# Patient Record
Sex: FEMALE | Race: WHITE | NOT HISPANIC OR LATINO | Employment: UNEMPLOYED | URBAN - NONMETROPOLITAN AREA
[De-identification: names, ages, dates, MRNs, and addresses within clinical notes are randomized per-mention and may not be internally consistent; named-entity substitution may affect disease eponyms.]

---

## 2023-03-29 ENCOUNTER — TRANSCRIBE ORDERS (OUTPATIENT)
Dept: PHYSICAL THERAPY | Facility: HOSPITAL | Age: 48
End: 2023-03-29
Payer: COMMERCIAL

## 2023-03-29 ENCOUNTER — HOSPITAL ENCOUNTER (OUTPATIENT)
Dept: PHYSICAL THERAPY | Facility: HOSPITAL | Age: 48
Setting detail: THERAPIES SERIES
Discharge: HOME | End: 2023-03-29
Payer: COMMERCIAL

## 2023-03-29 DIAGNOSIS — S83.239A COMPLEX TEAR OF MEDIAL MENISCUS OF KNEE AS CURRENT INJURY, INITIAL ENCOUNTER: Primary | ICD-10-CM

## 2023-03-29 DIAGNOSIS — S83.232A COMPLEX TEAR OF MEDIAL MENISCUS OF LEFT KNEE AS CURRENT INJURY, INITIAL ENCOUNTER: Primary | ICD-10-CM

## 2023-03-29 PROCEDURE — 97162 PT EVAL MOD COMPLEX 30 MIN: CPT

## 2023-03-29 NOTE — THERAPY EVALUATION
Outpatient Physical Therapy Ortho Initial Evaluation  [facility]     Patient Name: Roberta Hurley  : 1975  MRN: [id number]  Today's Date: 3/29/2023    Patient seen for 1 PT sessions.  Patient reports N/A% of improvement.  Next MD appt: tbd/prn  Recertification: 2023      Therapy Diagnosis: Complex L medial meniscus repair (DOS: 3/22/2023)     Visit Date: 2023    There is no problem list on file for this patient.       Past Medical History:   Diagnosis Date   • Asthma    • Cancer (HCC)    • Leukemia (HCC)         Past Surgical History:   Procedure Laterality Date   • HYSTERECTOMY     • MENISCECTOMY Left        Visit Dx:     ICD-10-CM ICD-9-CM   1. Complex tear of medial meniscus of left knee as current injury, subsequent encounter  S83.232D V58.89          Patient History     Row Name 23 1100             History    Chief Complaint Pain;Difficulty Walking;Balance Problems;Joint swelling  -AC      Type of Pain Knee pain  -AC      Date Current Problem(s) Began 23  -AC      Brief Description of Current Complaint Patient reports she hurt her L knee at work. Reports she got trapped from a big large pallette and fell and twist her knee. Then later on fell down some stairs. WBAT. Patient reports to use crutches until she is comfortable to walk without. Provided exercises after surgery quad sets, SLR after surgery. Patient reports 3 stairs with B HR. Flight of stairs inside home. doesnt have to go up them. Patient sleeps in her bed. Patient reports she props her knee at night time. Reports she lays mostly on her R side. 1 pillow in between knee and other pillow in between ankle.  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 PCO, ortho  -AC      Related/Recent Hospitalizations No  -AC      Location (Hospital Name) [facility]  -         Pain     Pain Location Knee  -AC      Pain at Present 8  -AC      Pain Frequency Constant/continuous  -AC         Fall Risk Assessment    Any falls in the past year: Yes  -AC      Number of falls reported in the last 12 months 3  -AC      Factors that contributed to the fall: Lost balance;Tripped  -AC            User Key  (r) = Recorded By, (t) = Taken By, (c) = Cosigned By    Initials Name Provider Type    AC Gianna Francisco PT Physical Therapist                 PT Ortho     Row Name 03/29/23 1100       Subjective Comments    Subjective Comments see patient history  -AC       Precautions and Contraindications    Precautions/Limitations other (see comments)  Medial meniscus repair precautions  -AC    Precautions WBAT; Limit flexion to 90 degrees for 6 weeks  -AC    Contraindications HX of CA; No ESTIM/US; Currently has Leukemia  -AC       Subjective Pain    Able to rate subjective pain? yes  -AC    Pre-Treatment Pain Level 8  -AC    Post-Treatment Pain Level 10  -AC       Posture/Observations    Forward Head Bilateral:;Mild  -AC    Scapular winging Bilateral:;Normal  -AC    Scoliosis Bilateral:;Normal  -AC    Lumbar lordosis Bilateral:;Normal  -AC    Iliac crests Bilateral:;Normal  pelvis level, no LLD/  -AC    Genu valgus Bilateral:;Normal  -AC    Genu varus Bilateral:;Normal  -AC    Posture/Observations Comments Patient does not appear to be in acute distress; Incisions healing well over L knee. 3 incisions present. No sign of infection. Increased swelling noted over L knee. Warm to touch. Slight bruising over L LE noted.  -AC       DTR- Lower Quarter Clearing    Patellar tendon (L2-4) Right:;2- Normal response  -AC    Achilles tendon (S1-2) Right:;2- Normal response  -AC       Sensory Screen for Light Touch- Lower Quarter Clearing    L1 (inguinal area) Bilateral:;Intact  -AC     L2 (anterior mid thigh) Bilateral:;Intact  -AC    L3 (distal anterior thigh) Bilateral:;Intact  -AC    L4 (medial lower leg/foot) Bilateral:;Intact  -AC    L5 (lateral lower leg/great toe) Bilateral:;Intact  -AC    S1 (bottom of foot) Bilateral:;Intact  -AC       Right Lower Ext    Rt Knee Extension/Flexion AROM 0-130°  -AC       Left Lower Ext    Lt Knee Extension/Flexion AROM 20°-57°  -AC       MMT (Manual Muscle Testing)    General MMT Comments R LE strength 5/5. L ankle DF strength 5/5. Patient unable to perform L SLR without min A from PT. Poor L quadricep activation noted.  -AC       Sensation    Sensation WNL? WNL  -AC    Light Touch No apparent deficits  -AC       Lower Extremity Flexibility    Hamstrings Left:;Mildly limited  -AC       RLE Quick Girth (cm)    Mid patella 40 cm  -AC    Distal thigh 47 cm  -AC    RLE Quick Girth Total 87  -AC       LLE Quick Girth (cm)    Mid patella 42 cm  -AC    Distal thigh 55 cm  -AC       Pathomechanics    Lower Extremity Pathomechanics Antalgic with midstance  -AC       Balance Skills Training    Balance Comments Unable to perform SLS on L LE due to increased pain and fall risk.  -AC       Transfers    Bed-Chair Reva (Transfers) set up;modified independence  -AC    Chair-Bed Reva (Transfers) set up;modified independence  -AC    Transfers, Bed-Chair-Bed, Assist Device axillary crutches  -AC    Sit-Stand Reva (Transfers) modified independence  -AC    Stand-Sit Reva (Transfers) modified independence  -AC    Transfers, Sit-Stand-Sit, Assist Device axillary crutches  -AC    Maintains Weight-bearing Status (Transfers) able to maintain  -AC    Transfer, Safety Issues balance decreased during turns;step length decreased  -AC    Transfer, Impairments pain;impaired balance;ROM decreased;decreased flexibility;strength decreased  -AC    Comment, (Transfers) Patient unweights L LE during sit to/from stand.  -AC       Gait/Stairs (Locomotion)     Humphreys Level (Gait) modified independence  -AC    Assistive Device (Gait) crutches, axillary  -AC    Distance in Feet (Gait) 75 feet  -AC    Pattern (Gait) 3-point;step-to  -AC    Deviations/Abnormal Patterns (Gait) left sided deviations;base of support, wide;gait speed decreased;antalgic  -AC    Left Sided Gait Deviations decreased knee extension;heel strike decreased  -AC    Stairs Assessment/Intervention deferred stairs this visit  -AC    Comment, (Gait/Stairs) Patient ambulates into clinic with B crutches.  -AC          User Key  (r) = Recorded By, (t) = Taken By, (c) = Cosigned By    Initials Name Provider Type    AC Gianna Francisco, PT Physical Therapist                            Therapy Education  Education Details: HEP provided this visit; education on performing activities every 1 hour on the hour. Icing regularly at least 15-20 mins each interval; no sleeping with pillow propped under knee and precautions; proper gait mechanics with crutches  Given: HEP, Symptoms/condition management, Pain management  Program: New  How Provided: Verbal, Demonstration, Written  Provided to: Patient  Level of Understanding: Verbalized, Demonstrated      PT OP Goals     Row Name 03/29/23 1100          PT Short Term Goals    STG Date to Achieve 04/19/23  -     STG 1 Patient to be I with HEP with additions/changes prior to next recertification.  -AC     STG 2 Patient to be compliant with limiting L knee flexion to 90 degrees for 6 weeks post-op.  -AC     STG 3 L knee flexion AROM >/= 90 degrees at 6 weeks post-op.  -AC     STG 4 L knee extension AROM </= 3°.  -AC     STG 5 Patient to perform 10 Sit to/from stand with equal weight bearing without cues from PT.  -     STG 6 Patient to perform L SLR x 20 reps with no knee lag present without cues from PT.  -        Long Term Goals    LTG 1 I with final HEP.  -AC     LTG 2 Patient to ambulate with no AD >/= 1/4 mile with non-antalgic, proper heel strike with no  difficulty.  -AC     LTG 3 L knee flexion AROM >/= 120°.  -AC     LTG 4 Patient to perform 30 second hold wall sit without pain or difficulty.  -AC     LTG 5 Patient to ascend/descend 3 steps with no HR assist or AD without no difficulty.  -AC     LTG 6 L LE strength 5/5.  -        Time Calculation    PT Goal Re-Cert Due Date 04/19/23  -           User Key  (r) = Recorded By, (t) = Taken By, (c) = Cosigned By    Initials Name Provider Type     Gianna Francisco, PT Physical Therapist                 PT Assessment/Plan     Row Name 03/29/23 1200          PT Assessment    Functional Limitations Impaired gait;Impaired locomotion;Limitation in home management;Limitations in community activities;Limitations in functional capacity and performance;Performance in leisure activities;Performance in self-care ADL;Performance in work activities  -     Impairments Balance;Gait;Coordination;Poor body mechanics;Pain;Range of motion;Muscle strength;Impaired muscle endurance;Impaired flexibility  -     Assessment Comments Patient is a 47 y.o. female who is s/p L medial mensicus repair on 3/22/2023. Patient presents with decreased L knee AROM, strength, flexibility, and increased pain. Patient also demo impaired gait mechanics and requires axillary crutches to ambulate at this time. Patient would benefit from skilled PT to address deficits listed and return to PLOF. Barrier to rehab at this time may be due to patient currently having Leukemia. Patient insurance does not allow tx to begin on initial eval. A small HEP was established.  -AC     Please refer to paper survey for additional self-reported information No  -AC     Rehab Potential Good  -AC     Patient/caregiver participated in establishment of treatment plan and goals Yes  -AC     Patient would benefit from skilled therapy intervention Yes  -AC        PT Plan    PT Frequency 3x/week  -AC     Predicted Duration of Therapy Intervention (PT) 15-20 visits  -AC      Planned CPT's? PT EVAL MOD COMPLELITY: 33213;PT RE-EVAL: 34852;PT THER PROC EA 15 MIN: 52424;PT THER ACT EA 15 MIN: 33719;PT MANUAL THERAPY EA 15 MIN: 94028;PT NEUROMUSC RE-EDUCATION EA 15 MIN: 21186;PT GAIT TRAINING EA 15 MIN: 62473;PT SELF CARE/HOME MGMT/TRAIN EA 15: 83228;PT HOT OR COLD PACK TREAT [facility];PT SELF CARE/MGMT/TRAIN 15 MIN: 60610;PT THER SUPP EA 15 MIN  -AC     PT Plan Comments Progress overall L knee AROM, strength, flexibility, and gait.  -AC           User Key  (r) = Recorded By, (t) = Taken By, (c) = Cosigned By    Initials Name Provider Type    Gianna Azul PT Physical Therapist                   OP Exercises     Row Name 03/29/23 1100             Subjective Comments    Subjective Comments see patient history  -AC         Subjective Pain    Able to rate subjective pain? yes  -AC      Pre-Treatment Pain Level 8  -AC      Post-Treatment Pain Level 10  -AC         Exercise 1    Exercise Name 1 Quad Sets  -AC      Cueing 1 Verbal;Tactile;Demo  -AC      Sets 1 1  -AC      Reps 1 20  -AC      Time 1 5 sec hold  -AC      Additional Comments towel roll under knee  -AC         Exercise 2    Exercise Name 2 Heel slides  -AC      Cueing 2 Verbal;Tactile  -AC      Time 2 3 minutes with 5 sec hold at top position  -AC         Exercise 3    Exercise Name 3 Heel prop for knee extensions  -AC      Cueing 3 Verbal;Tactile  -AC      Time 3 3 minutes  -AC         Exercise 4    Exercise Name 4 Patient education  -AC      Additional Comments see flowsheet  -AC         Exercise 5    Exercise Name 5 Ice provided to go  -AC            User Key  (r) = Recorded By, (t) = Taken By, (c) = Cosigned By    Initials Name Provider Type    Gianna Azul PT Physical Therapist                              Outcome Measure Options: Lower Extremity Functional Scale (LEFS)  Lower Extremity Functional Index  Any of your usual work, housework or school activities: Extreme difficulty or unable to perform activity  Your usual  hobbies, recreational or sporting activities: Extreme difficulty or unable to perform activity  Getting into or out of the bath: Quite a bit of difficulty  Walking between rooms: Extreme difficulty or unable to perform activity  Putting on your shoes or socks: Quite a bit of difficulty  Squatting: Extreme difficulty or unable to perform activity  Lifting an object, like a bag of groceries from the floor: Extreme difficulty or unable to perform activity  Performing light activities around your home: Extreme difficulty or unable to perform activity  Performing heavy activities around your home: Extreme difficulty or unable to perform activity  Getting into or out of a car: Extreme difficulty or unable to perform activity  Walking 2 blocks: Extreme difficulty or unable to perform activity  Walking a mile: Extreme difficulty or unable to perform activity  Going up or down 10 stairs (about 1 flight of stairs): Extreme difficulty or unable to perform activity  Standing for 1 hour: Extreme difficulty or unable to perform activity  Sitting for 1 hour: Extreme difficulty or unable to perform activity  Running on even ground: Extreme difficulty or unable to perform activity  Running on uneven ground: Extreme difficulty or unable to perform activity  Making sharp turns while running fast: Extreme difficulty or unable to perform activity  Hopping: Extreme difficulty or unable to perform activity  Rolling over in bed: Extreme difficulty or unable to perform activity  Total: 2      Time Calculation:     Start Time: 1140  Stop Time: 1224  Time Calculation (min): 44 min     Therapy Charges for Today     Code Description Service Date Service Provider Modifiers Qty    93472051966 HC PT THER SUPP EA 15 MIN 3/29/2023 [place], PT GP 1    40814681937 HC PT EVAL MOD COMPLEXITY 3 3/29/2023 [place] Gianna, PT GP 1          PT G-Codes  Outcome Measure Options: Lower Extremity Functional Scale (LEFS)  Total: 2         Autumn  Maryam, PT , DPT 3/29/2023

## 2023-03-30 ENCOUNTER — HOSPITAL ENCOUNTER (OUTPATIENT)
Dept: PHYSICAL THERAPY | Facility: HOSPITAL | Age: 48
Setting detail: THERAPIES SERIES
Discharge: HOME | End: 2023-03-30
Payer: COMMERCIAL

## 2023-03-30 DIAGNOSIS — S83.239A COMPLEX TEAR OF MEDIAL MENISCUS OF KNEE AS CURRENT INJURY, INITIAL ENCOUNTER: Primary | ICD-10-CM

## 2023-03-30 PROCEDURE — 97110 THERAPEUTIC EXERCISES: CPT | Performed by: PHYSICAL THERAPIST

## 2023-03-30 NOTE — THERAPY TREATMENT NOTE
Outpatient Physical Therapy Ortho Treatment Note  [facility]     Patient Name: Roberta Hurley  : 1975  MRN: [id number]  Today's Date: 3/30/2023      Visit Date: 2023     Patient seen for 2 PT sessions.  Patient reports N/A% of improvement.  Next MD appt: solange/prn  Recertification: 2023      Therapy Diagnosis: Complex L medial meniscus repair (DOS: 3/22/2023)     Visit Dx:    ICD-10-CM ICD-9-CM   1. Complex tear of medial meniscus of knee as current injury, initial encounter  S83.239A 836.0       There is no problem list on file for this patient.       Past Medical History:   Diagnosis Date   • Asthma    • Cancer (HCC)    • Leukemia (HCC)         Past Surgical History:   Procedure Laterality Date   • HYSTERECTOMY     • MENISCECTOMY Left         PT Ortho     Row Name 23 1100       Precautions and Contraindications    Precautions WBAT; Limit flexion to 90 degrees for 6 weeks  -AJ    Contraindications HX of CA; No ESTIM/US; Currently has Leukemia  -AJ       Subjective Pain    Pre-Treatment Pain Level 6  -AJ    Post-Treatment Pain Level 7  -AJ       Left Lower Ext    Lt Knee Extension/Flexion AROM 10°-90°  -          User Key  (r) = Recorded By, (t) = Taken By, (c) = Cosigned By    Initials Name Provider Type    Danyell Dixon, PT DPT Physical Therapist                             PT Assessment/Plan     Row Name 23 1100          PT Assessment    Assessment Comments Improved AROM today in both flexin and extension. Changed heel slides ot sitting to allow patient ot visualize 90° better. Added add sets to HEP. Unable to make a full revolution with Pro II LE bike.  -        PT Plan    PT Frequency 3x/week  -     PT Plan Comments Try gait with 1 axillary crutch next session.  -           User Key  (r) = Recorded By, (t) = Taken By, (c) = Cosigned By    Initials Name Provider Type    Danyell Dixon, [place] DPT Physical Therapist                   OP  "Exercises     Row Name 03/30/23 1100             Subjective Comments    Subjective Comments patient eports when she does heel slides it feels like it is riping inside.  -AJ         Subjective Pain    Able to rate subjective pain? yes  -AJ      Pre-Treatment Pain Level 6  -AJ      Post-Treatment Pain Level 7  -AJ         Exercise 1    Exercise Name 1 Crutches adjusted ot better fit patient  -AJ         Exercise 2    Exercise Name 2 Pro II LE- Rocking for ROM  -AJ      Time 2 5 min  -AJ      Additional Comments s=7, L 0.0  -AJ         Exercise 3    Exercise Name 3 Heel slides to 90° in sitting  -AJ      Sets 3 1  -AJ      Reps 3 10  -AJ      Time 3 5\" hold  -AJ         Exercise 4    Exercise Name 4 Quad sets with towel roll  -AJ      Sets 4 1  -AJ      Reps 4 20  -AJ      Time 4 5\" hold  -AJ         Exercise 5    Exercise Name 5 Add squeezes  -AJ      Sets 5 1  -AJ      Reps 5 20  -AJ      Time 5 5\" hold  -AJ         Exercise 6    Exercise Name 6 L SLR  -AJ      Sets 6 2  -AJ      Reps 6 5  -AJ      Time 6 no hold  -AJ      Additional Comments max A  -AJ         Exercise 7    Exercise Name 7 Heel prop  -AJ      Time 7 5 min  -AJ            User Key  (r) = Recorded By, (t) = Taken By, (c) = Cosigned By    Initials Name Provider Type    Danyell Dixon, PT DPT Physical Therapist                 All therapeutic interventions performed today were to address current functional limitations and/or deficits in addressing all physical therapy goals.                PT OP Goals     Row Name 03/30/23 1100          PT Short Term Goals    STG Date to Achieve 04/19/23  -AJ     STG 1 Patient to be I with HEP with additions/changes prior to next recertification.  -AJ     STG 1 Progress Partially Met;Ongoing  -AJ     STG 2 Patient to be compliant with limiting L knee flexion to 90 degrees for 6 weeks post-op.  -AJ     STG 2 Progress Ongoing;Progressing  -AJ     STG 3 L knee flexion AROM >/= 90 degrees at 6 weeks post-op.  -AJ  "    STG 3 Progress Met;Ongoing  -     STG 4 L knee extension AROM </= 3°.  -     STG 4 Progress Ongoing;Progressing  -     STG 5 Patient to perform 10 Sit to/from stand with equal weight bearing without cues from PT.  -     STG 6 Patient to perform L SLR x 20 reps with no knee lag present without cues from PT.  -        Long Term Goals    LTG 1 I with final HEP.  -     LTG 2 Patient to ambulate with no AD >/= 1/4 mile with non-antalgic, proper heel strike with no difficulty.  -     LTG 3 L knee flexion AROM >/= 120°.  -     LTG 4 Patient to perform 30 second hold wall sit without pain or difficulty.  -     LTG 5 Patient to ascend/descend 3 steps with no HR assist or AD without no difficulty.  -     LTG 6 L LE strength 5/5.  -        Time Calculation    PT Goal Re-Cert Due Date 04/19/23  -           User Key  (r) = Recorded By, (t) = Taken By, (c) = Cosigned By    Initials Name Provider Type    Danyell Dixon PT DPT Physical Therapist                Therapy Education  Education Details: HEP: switch heel slides with strap to seated heel slides t obetter asses 90° stopping point.  Given: HEP, Symptoms/condition management, Pain management, Mobility training  Program: New, Reinforced, Modified  How Provided: Verbal  Provided to: Patient  Level of Understanding: Teach back education performed, Verbalized, Demonstrated              Time Calculation:   Start Time: 1137 (Patient arrived late today.)  Stop Time: 1216  Time Calculation (min): 39 min  Therapy Charges for Today     Code Description Service Date Service Provider Modifiers Qty    30517684013 HC PT THER SUPP EA 15 MIN 3/30/2023 Danyell Merida PT DPT GP 1    70583794382 HC PT THER PROC EA 15 MIN 3/30/2023 Danyell Merida PT DPT GP 3                This document has been electronically signed by Danyell Merida PT DPT, [facility] on March 30, 2023 13:34 CDT

## 2023-04-03 ENCOUNTER — HOSPITAL ENCOUNTER (OUTPATIENT)
Dept: PHYSICAL THERAPY | Facility: HOSPITAL | Age: 48
Setting detail: THERAPIES SERIES
Discharge: HOME | End: 2023-04-03
Payer: COMMERCIAL

## 2023-04-03 DIAGNOSIS — S83.239A COMPLEX TEAR OF MEDIAL MENISCUS OF KNEE AS CURRENT INJURY, INITIAL ENCOUNTER: Primary | ICD-10-CM

## 2023-04-03 PROCEDURE — 97110 THERAPEUTIC EXERCISES: CPT

## 2023-04-03 NOTE — THERAPY TREATMENT NOTE
Outpatient Physical Therapy Ortho Treatment Note  [facility]     Patient Name: Roberta Hurley  : 1975  MRN: [id number]  Today's Date: 4/3/2023      Visit Date: 2023     Patient seen for 3 PT sessions.  Patient reports N/A% of improvement.  Next MD appt: solange/prn  Recertification: 2023      Therapy Diagnosis: Complex L medial meniscus repair (DOS: 3/22/2023)     Visit Dx:    ICD-10-CM ICD-9-CM   1. Complex tear of medial meniscus of knee as current injury, initial encounter  S83.239A 836.0       There is no problem list on file for this patient.       Past Medical History:   Diagnosis Date   • Asthma    • Cancer    • Leukemia         Past Surgical History:   Procedure Laterality Date   • HYSTERECTOMY     • MENISCECTOMY Left         PT Ortho     Row Name 23 1300       Precautions and Contraindications    Precautions WBAT; Limit flexion to 90 degrees for 6 weeks  -    Contraindications HX of CA; No ESTIM/US; Currently has Leukemia  -       Subjective Pain    Able to rate subjective pain? yes  -    Pre-Treatment Pain Level 7  -          User Key  (r) = Recorded By, (t) = Taken By, (c) = Cosigned By    Initials Name Provider Type    Thelma Wesley, ASPEN Physical Therapist Assistant                             PT Assessment/Plan     Row Name 23 1300          PT Assessment    Assessment Comments continues to lack full knee extension on visual assessment. difficulty with SLR today, but is finally able to pick it up, though with quad lag. did not progress to single crutch secondary to trip today with pt reporting increased pain.  -        PT Plan    PT Frequency 3x/week  -     PT Plan Comments standing weight shifts, single crutch gait, step up 4 inch step, shuttle low resistance  -           User Key  (r) = Recorded By, (t) = Taken By, (c) = Cosigned By    Initials Name Provider Type    Thelma Wesley PTA Physical Therapist Assistant                   OP  "Exercises     Row Name 04/03/23 1300             Subjective Comments    Subjective Comments states that she had a trip this morning. was wearing open toed shoes. states that she caught herself with the left leg, jarried the knee. didn't bend it  -         Subjective Pain    Able to rate subjective pain? yes  -      Pre-Treatment Pain Level 7  -      Post-Treatment Pain Level --  \"numb\"  -         Exercise 1    Exercise Name 1 Pro II LE's full revolutions  -      Time 1 10 minutes  -      Additional Comments L 1.0 Seat 9 less than 90° knee flexion  -         Exercise 2    Exercise Name 2 Heel Prop  -      Time 2 5 minutes  -         Exercise 3    Exercise Name 3 Quad Sets with Towel Roll  -      Reps 3 20  -      Time 3 5 sec hold  -         Exercise 4    Exercise Name 4 SLR with Clinician Assist  -      Reps 4 15  -MH      Additional Comments max assist  -         Exercise 5    Exercise Name 5 SAQ  -      Sets 5 2  -MH      Reps 5 15  -      Time 5 5 sec hold  -         Exercise 6    Exercise Name 6 SLR no assist  -      Sets 6 2  -      Reps 6 10  -      Additional Comments no hold  -         Exercise 7    Exercise Name 7 Prone TKE's  -      Sets 7 2  -MH      Reps 7 15  -      Time 7 5 sec hold  -         Exercise 8    Exercise Name 8 Heel Slides with quad sets in extension  -      Additional Comments 90° flexion restriction  -            User Key  (r) = Recorded By, (t) = Taken By, (c) = Cosigned By    Initials Name Provider Type    Thelma Wesley, PTA Physical Therapist Assistant                              PT OP Goals     Row Name 04/03/23 1300          PT Short Term Goals    STG Date to Achieve 04/19/23  -     STG 1 Patient to be I with HEP with additions/changes prior to next recertification.  -     STG 1 Progress Partially Met;Ongoing  -     STG 2 Patient to be compliant with limiting L knee flexion to 90 degrees for 6 weeks post-op.  -     STG " 2 Progress Ongoing;Progressing  -     STG 3 L knee flexion AROM >/= 90 degrees at 6 weeks post-op.  -     STG 3 Progress Met;Ongoing  -     STG 4 L knee extension AROM </= 3°.  -     STG 4 Progress Ongoing;Progressing  -     STG 5 Patient to perform 10 Sit to/from stand with equal weight bearing without cues from PT.  -     STG 6 Patient to perform L SLR x 20 reps with no knee lag present without cues from PT.  -        Long Term Goals    LTG 1 I with final HEP.  -     LTG 2 Patient to ambulate with no AD >/= 1/4 mile with non-antalgic, proper heel strike with no difficulty.  -     LTG 3 L knee flexion AROM >/= 120°.  -     LTG 4 Patient to perform 30 second hold wall sit without pain or difficulty.  -     LTG 5 Patient to ascend/descend 3 steps with no HR assist or AD without no difficulty.  -     LTG 6 L LE strength 5/5.  -        Time Calculation    PT Goal Re-Cert Due Date 04/19/23  -           User Key  (r) = Recorded By, (t) = Taken By, (c) = Cosigned By    Initials Name Provider Type    Thelma Wesley PTA Physical Therapist Assistant                Therapy Education  Education Details: SAQ, SLR  Given: HEP, Symptoms/condition management, Pain management, Mobility training  Program: Reinforced  How Provided: Verbal, Demonstration, Written  Provided to: Patient  Level of Understanding: Teach back education performed, Verbalized, Demonstrated              Time Calculation:   Start Time: 1345  Stop Time: 1455  Time Calculation (min): 70 min  Total Timed Code Minutes- PT: 70 minute(s)  Therapy Charges for Today     Code Description Service Date Service Provider Modifiers Qty    65273890708 HC PT THER SUPP EA 15 MIN 4/3/2023 Thelma Mccrary PTA GP, CQ 1    80571924179 HC PT THER PROC EA 15 MIN 4/3/2023 Thelma Mccrary PTA GP, CQ 5                    Thelma Mccrary PTA  4/3/2023       This document has been electronically signed by Thelma Mccrary PTA on April 3, 2023 15:07 CDT

## 2023-04-05 ENCOUNTER — HOSPITAL ENCOUNTER (OUTPATIENT)
Dept: PHYSICAL THERAPY | Facility: HOSPITAL | Age: 48
Setting detail: THERAPIES SERIES
Discharge: HOME | End: 2023-04-05
Payer: COMMERCIAL

## 2023-04-05 DIAGNOSIS — S83.239A COMPLEX TEAR OF MEDIAL MENISCUS OF KNEE AS CURRENT INJURY, INITIAL ENCOUNTER: Primary | ICD-10-CM

## 2023-04-05 PROCEDURE — 97110 THERAPEUTIC EXERCISES: CPT

## 2023-04-05 PROCEDURE — 97140 MANUAL THERAPY 1/> REGIONS: CPT

## 2023-04-05 NOTE — THERAPY TREATMENT NOTE
Outpatient Physical Therapy Ortho Treatment Note  [facility]     Patient Name: Roberta Hurley  : 1975  MRN: [id number]  Today's Date: 2023     Pt seen for 4 PT sessions  Reported Improvement:  N/A %  MD Visit: 2023  Recheck Date: 2023    Therapy Diagnosis:  Complex L medial meniscus repair (DOS: 3/22/2023)         Visit Date: 2023    Visit Dx:    ICD-10-CM ICD-9-CM   1. Complex tear of medial meniscus of knee as current injury, initial encounter  S83.239A 836.0       There is no problem list on file for this patient.       Past Medical History:   Diagnosis Date   • Asthma    • Cancer    • Leukemia         Past Surgical History:   Procedure Laterality Date   • HYSTERECTOMY     • MENISCECTOMY Left         PT Ortho     Row Name 23 1300       Subjective Comments    Subjective Comments Doing better today, been using one crutch most of the day.  -       Precautions and Contraindications    Precautions/Limitations --  Medial Meniscus precautions  -    Precautions WBAT; Limit flexion to 90 degrees for 6 weeks  -    Contraindications HX of CA; No ESTIM/US; Currently has Leukemia  -       Subjective Pain    Able to rate subjective pain? yes  -    Pre-Treatment Pain Level 5  -JW       Left Lower Ext    Lt Knee Extension/Flexion AROM 8°  -          User Key  (r) = Recorded By, (t) = Taken By, (c) = Cosigned By    Initials Name Provider Type    Nya Riggins PTA Physical Therapist Assistant                             PT Assessment/Plan     Row Name 23 1300          PT Assessment    Assessment Comments Pt lacks full knee extension with gait despite vc's.  Pt arrives using single crutch appropriately with antalgic pattern.  Good effort with all therex and receptive to education and reinformcemtn of improved mm recruitment and stronger mm contraction. especially with QS..  Pt reports no pian with QS.  Defers ice post tx session.  Instruted to begin scar  "massasge and reinforced QS and SLR  -JW        PT Plan    PT Frequency 3x/week  -JW     PT Plan Comments Next visit add shuttle with low resistance.  -JW           User Key  (r) = Recorded By, (t) = Taken By, (c) = Cosigned By    Initials Name Provider Type    Nya Riggins PTA Physical Therapist Assistant                   OP Exercises     Row Name 04/05/23 1300             Subjective Comments    Subjective Comments Doing better today, been using one crutch most of the day.  -JW         Subjective Pain    Able to rate subjective pain? yes  -JW      Pre-Treatment Pain Level 5  -JW      Post-Treatment Pain Level --  \"feels kind of numb\"  -JW         Exercise 1    Exercise Name 1 Pro II LE's full revolutions  -JW      Time 1 10 minutes  -JW      Additional Comments S=9 for 90° precautions  -JW         Exercise 2    Exercise Name 2 B st HS st  -JW      Reps 2 2  -JW      Time 2 30  -JW         Exercise 3    Exercise Name 3 B incline gastroc st  -JW      Reps 3 2  -JW      Time 3 30  -JW         Exercise 4    Exercise Name 4 Scar massage and patellar mobs  -JW         Exercise 5    Exercise Name 5 Heel Prop  -JW      Time 5 5 min  -JW         Exercise 6    Exercise Name 6 Heel slides  -JW      Reps 6 20  -JW      Additional Comments no strap - 90° restriction  -JW         Exercise 7    Exercise Name 7 QS  -JW      Sets 7 2  -JW      Reps 7 10  -JW      Time 7 5 sec hold  -JW      Additional Comments emphasis on proper form  -JW         Exercise 8    Exercise Name 8 SLR  -JW      Sets 8 2  -JW      Reps 8 10  -JW      Additional Comments lag present  -JW            User Key  (r) = Recorded By, (t) = Taken By, (c) = Cosigned By    Initials Name Provider Type    Nya Riggins PTA Physical Therapist Assistant                         Manual Rx (last 36 hours)     Manual Treatments     Row Name 04/05/23 1300             Manual Rx 1    Manual Rx 1 Location L knee  -JW      Manual Rx 1 Type scar massage and patellar " mobs  -      Manual Rx 1 Duration 8 min  -            User Key  (r) = Recorded By, (t) = Taken By, (c) = Cosigned By    Initials Name Provider Type    JOSIANE Nya Soriano PTA Physical Therapist Assistant                 PT OP Goals     Row Name 04/05/23 1300          PT Short Term Goals    STG Date to Achieve 04/19/23  -     STG 1 Patient to be I with HEP with additions/changes prior to next recertification.  -     STG 1 Progress Partially Met;Ongoing  -     STG 2 Patient to be compliant with limiting L knee flexion to 90 degrees for 6 weeks post-op.  -     STG 2 Progress Ongoing;Progressing  -     STG 3 L knee flexion AROM >/= 90 degrees at 6 weeks post-op.  -     STG 3 Progress Met;Ongoing  -     STG 4 L knee extension AROM </= 3°.  -     STG 4 Progress Ongoing;Progressing  -     STG 5 Patient to perform 10 Sit to/from stand with equal weight bearing without cues from PT.  -     STG 6 Patient to perform L SLR x 20 reps with no knee lag present without cues from PT.  -        Long Term Goals    LTG 1 I with final HEP.  -     LTG 2 Patient to ambulate with no AD >/= 1/4 mile with non-antalgic, proper heel strike with no difficulty.  -     LTG 3 L knee flexion AROM >/= 120°.  -     LTG 4 Patient to perform 30 second hold wall sit without pain or difficulty.  -     LTG 5 Patient to ascend/descend 3 steps with no HR assist or AD without no difficulty.  -     LTG 6 L LE strength 5/5.  -        Time Calculation    PT Goal Re-Cert Due Date 04/19/23  -           User Key  (r) = Recorded By, (t) = Taken By, (c) = Cosigned By    Initials Name Provider Type    JOSIANE Nya Soriano PTA Physical Therapist Assistant                               Time Calculation:   Start Time: 1300  Stop Time: 1355  Time Calculation (min): 55 min  Therapy Charges for Today     Code Description Service Date Service Provider Modifiers Qty    39103419675  PT THER PROC EA 15 MIN 4/5/2023 Nya Soriano PTA GP,  CQ 3    10441727840 HC PT MANUAL THERAPY EA 15 MIN 4/5/2023 Nya Soriano PTA GP, CQ 1    40371798014 HC PT THER SUPP EA 15 MIN 4/5/2023 Nya Soriano PTA GP, CQ 1                    Nya Soriano PTA  4/5/2023

## 2023-04-07 ENCOUNTER — HOSPITAL ENCOUNTER (OUTPATIENT)
Dept: PHYSICAL THERAPY | Facility: HOSPITAL | Age: 48
Setting detail: THERAPIES SERIES
Discharge: HOME | End: 2023-04-07
Payer: COMMERCIAL

## 2023-04-07 DIAGNOSIS — S83.239A COMPLEX TEAR OF MEDIAL MENISCUS OF KNEE AS CURRENT INJURY, INITIAL ENCOUNTER: Primary | ICD-10-CM

## 2023-04-07 PROCEDURE — 97110 THERAPEUTIC EXERCISES: CPT

## 2023-04-07 NOTE — THERAPY TREATMENT NOTE
"    Outpatient Physical Therapy Ortho Treatment Note  [facility]     Patient Name: Roberta Hurley  : 1975  MRN: [id number]  Today's Date: 2023    Pt seen for 5 PT sessions  Reported Improvement:  N/A %  MD Visit: 2023  Recheck Date: 2023     Therapy Diagnosis:  Complex L medial meniscus repair (DOS: 3/22/2023)                               Visit Date: 2023    Visit Dx:    ICD-10-CM ICD-9-CM   1. Complex tear of medial meniscus of knee as current injury, initial encounter  S83.239A 836.0       There is no problem list on file for this patient.       Past Medical History:   Diagnosis Date   • Asthma    • Cancer    • Leukemia         Past Surgical History:   Procedure Laterality Date   • HYSTERECTOMY     • MENISCECTOMY Left         PT Ortho     Row Name 23 1100       Precautions and Contraindications    Precautions/Limitations --  Medial Meniscus precautions  -AC    Precautions WBAT; Limit flexion to 90 degrees for 6 weeks  -AC    Contraindications HX of CA; No ESTIM/US; Currently has Leukemia  -AC       Subjective Pain    Able to rate subjective pain? yes  -AC    Post-Treatment Pain Level 0  \"feels numb\"  -AC          User Key  (r) = Recorded By, (t) = Taken By, (c) = Cosigned By    Initials Name Provider Type    Gianna Azul, PT Physical Therapist                             PT Assessment/Plan     Row Name 23 1200          PT Assessment    Assessment Comments Patient still presents with L knee lag with L SLR. Demo improved ability to maintain hold when performing L SLR. Patient able to maintain 90° knee flexion restriction well. Patient also demo good recall of HEP. Ice to go post tx session.  -AC        PT Plan    PT Frequency 3x/week  -AC     PT Plan Comments Attempt prone hang next visit with ankle weight. Revisit patellar mobs and scar massage for reinforcement. Measure L knee ext AROM next visit. Update HEP.  -AC           User Key  (r) = Recorded " "By, (t) = Taken By, (c) = Cosigned By    Initials Name Provider Type    AC Gianna Francisco, PT Physical Therapist                   OP Exercises     Row Name 04/07/23 1100             Subjective Comments    Subjective Comments Knee if feeling better. Feel like i can walk better. Just slight discomfort in knee today.  -AC         Subjective Pain    Able to rate subjective pain? yes  -AC      Pre-Treatment Pain Level 5  -AC      Post-Treatment Pain Level 0  \"feels numb\"  -AC         Exercise 1    Exercise Name 1 Pro II LE's full revolutions  -AC      Time 1 10 minutes  -AC      Additional Comments S=9 for 90° precautions  -AC         Exercise 2    Exercise Name 2 B st HS st  -AC      Reps 2 2  -AC      Time 2 30  -AC         Exercise 3    Exercise Name 3 B incline gastroc st  -AC      Reps 3 2  -AC      Time 3 30  -AC         Exercise 4    Exercise Name 4 Step ups - F/L  -AC      Sets 4 2  -AC      Reps 4 10  -AC      Additional Comments 4\" step to maintain 90° restriction  -AC         Exercise 5    Exercise Name 5 Shuttle press: 2L  -AC      Time 5 3 mins  -AC      Additional Comments 2 bands  -AC         Exercise 6    Exercise Name 6 pbars  fwd/retro gait  -AC      Reps 6 6 laps  -AC      Additional Comments no HR assist  -AC         Exercise 7    Exercise Name 7 QS  -AC      Sets 7 1  -AC      Reps 7 20  -AC      Time 7 5 sec hold  -AC      Additional Comments emphasis on proper form  -AC         Exercise 8    Exercise Name 8 SLR  -AC      Sets 8 2  -AC      Reps 8 10  -AC      Additional Comments slight knee lag present  -AC         Exercise 9    Exercise Name 9 Prone TKE  -AC      Sets 9 2  -AC      Reps 9 15  -AC      Time 9 5\" hold  -AC         Exercise 10    Exercise Name 10 Heel prop  -AC      Time 10 5 minutes  -AC      Additional Comments 2# weight  -AC            User Key  (r) = Recorded By, (t) = Taken By, (c) = Cosigned By    Initials Name Provider Type    AC Gianna Francisco, PT Physical Therapist    "                           PT OP Goals     Row Name 04/07/23 1200          PT Short Term Goals    STG Date to Achieve 04/19/23  -AC     STG 1 Patient to be I with HEP with additions/changes prior to next recertification.  -AC     STG 1 Progress Partially Met;Ongoing  -AC     STG 2 Patient to be compliant with limiting L knee flexion to 90 degrees for 6 weeks post-op.  -AC     STG 2 Progress Ongoing;Progressing  -AC     STG 3 L knee flexion AROM >/= 90 degrees at 6 weeks post-op.  -AC     STG 3 Progress Met;Ongoing  -AC     STG 4 L knee extension AROM </= 3°.  -AC     STG 4 Progress Ongoing;Progressing  -AC     STG 5 Patient to perform 10 Sit to/from stand with equal weight bearing without cues from PT.  -     STG 6 Patient to perform L SLR x 20 reps with no knee lag present without cues from PT.  -        Long Term Goals    LTG 1 I with final HEP.  -     LTG 2 Patient to ambulate with no AD >/= 1/4 mile with non-antalgic, proper heel strike with no difficulty.  -AC     LTG 3 L knee flexion AROM >/= 120°.  -     LTG 4 Patient to perform 30 second hold wall sit without pain or difficulty.  -     LTG 5 Patient to ascend/descend 3 steps with no HR assist or AD without no difficulty.  -     LTG 6 L LE strength 5/5.  -        Time Calculation    PT Goal Re-Cert Due Date 04/19/23  -           User Key  (r) = Recorded By, (t) = Taken By, (c) = Cosigned By    Initials Name Provider Type    AC Gianna Francisco, PT Physical Therapist                               Time Calculation:   Start Time: 1132  Stop Time: 1220  Time Calculation (min): 48 min  Total Timed Code Minutes- PT: 48 minute(s)  Therapy Charges for Today     Code Description Service Date Service Provider Modifiers Qty    83465292095 HC PT THER SUPP EA 15 MIN 4/7/2023 Gianna Francisco, PT GP 1    39601654284 HC PT THER PROC EA 15 MIN 4/7/2023 Gianna Francisco, PT GP 3                    Gianna SD Francisco , DPT 4/7/2023

## 2023-04-10 ENCOUNTER — HOSPITAL ENCOUNTER (OUTPATIENT)
Dept: PHYSICAL THERAPY | Facility: HOSPITAL | Age: 48
Setting detail: THERAPIES SERIES
Discharge: HOME | End: 2023-04-10
Payer: COMMERCIAL

## 2023-04-10 DIAGNOSIS — S83.239A COMPLEX TEAR OF MEDIAL MENISCUS OF KNEE AS CURRENT INJURY, INITIAL ENCOUNTER: Primary | ICD-10-CM

## 2023-04-10 NOTE — THERAPY TREATMENT NOTE
Outpatient Physical Therapy Ortho Treatment Note  [facility]     Patient Name: Roberta Hurley  : 1975  MRN: [id number]  Today's Date: 4/10/2023     Pt seen for 6 PT sessions  Reported Improvement:  N/A %  MD Visit: 2023  Recheck Date: 2023    Therapy Diagnosis:   Complex L medial meniscus repair (DOS: 3/22/2023)         Visit Date: 04/10/2023    Visit Dx:    ICD-10-CM ICD-9-CM   1. Complex tear of medial meniscus of knee as current injury, initial encounter  S83.239A 836.0       There is no problem list on file for this patient.       Past Medical History:   Diagnosis Date   • Asthma    • Cancer    • Leukemia         Past Surgical History:   Procedure Laterality Date   • HYSTERECTOMY     • MENISCECTOMY Left         PT Ortho     Row Name 04/10/23 1300       Subjective Comments    Subjective Comments Doing good, had to work a little yesterday but did my exercises  -       Precautions and Contraindications    Precautions/Limitations --  Medial Meniscus precautions  -    Precautions WBAT; Limit flexion to 90 degrees for 6 weeks  -    Contraindications HX of CA; No ESTIM/US; Currently has Leukemia  -       Subjective Pain    Able to rate subjective pain? yes  -    Pre-Treatment Pain Level 4  with full extension  -       Left Lower Ext    Lt Knee Extension/Flexion AROM 5° - 90°  -          User Key  (r) = Recorded By, (t) = Taken By, (c) = Cosigned By    Initials Name Provider Type    Nya Riggins PTA Physical Therapist Assistant                             PT Assessment/Plan     Row Name 04/10/23 1400          PT Assessment    Assessment Comments Pt enters clinic without any AD.  Pt ambulates with L knee flexed at ~ 15°.  Pt educated on importance of full knee extension for normalized gait. Pt struggles with heel walks in pbars .  Much improved SLR with minimal lag present as muscle fatigues. Defers ice post tx session.  -        PT Plan    PT Frequency  "3x/week  -JW     PT Plan Comments Next visit add banded or CC TKE's.  continue prone hangs for ROM.  -JW           User Key  (r) = Recorded By, (t) = Taken By, (c) = Cosigned By    Initials Name Provider Type    Nya Riggins PTA Physical Therapist Assistant                   OP Exercises     Row Name 04/10/23 1300             Subjective Comments    Subjective Comments Doing good, had to work a little yesterday but did my exercises  -JW         Subjective Pain    Able to rate subjective pain? yes  -JW      Pre-Treatment Pain Level 4  with full extension  -JW      Post-Treatment Pain Level 0  -JW         Exercise 1    Exercise Name 1 Pro II LE's full revolutions  -JW      Time 1 10 minutes  -JW      Additional Comments S+9 for 90° L 5.0  -JW         Exercise 2    Exercise Name 2 B st HS st  -JW      Reps 2 2  -JW      Time 2 30  -JW         Exercise 3    Exercise Name 3 B incline gastroc st  -JW      Reps 3 2  -JW      Time 3 30  -JW         Exercise 4    Exercise Name 4 Prone Hang  -JW      Time 4 5 min  -JW      Additional Comments 2# overpressure  -JW         Exercise 5    Exercise Name 5 Pbar Fwd Heel walks - retro XL steps  -JW      Time 5 5 min  -JW         Exercise 6    Exercise Name 6 Fwd Hudles  -JW      Reps 6 5 laps  -JW      Additional Comments 6 x 6\" hurdles  -JW         Exercise 7    Exercise Name 7 LAQ  -JW      Sets 7 1  -JW      Reps 7 20  -JW      Time 7 5\" hold  -JW         Exercise 8    Exercise Name 8 Seated HS curls  -JW      Reps 8 20  -JW      Time 8 5\" hold  -JW         Exercise 9    Exercise Name 9 SLR  -JW      Sets 9 2  -JW      Reps 9 10  -JW            User Key  (r) = Recorded By, (t) = Taken By, (c) = Cosigned By    Initials Name Provider Type    Nya Riggins PTA Physical Therapist Assistant                              PT OP Goals     Row Name 04/10/23 1300          PT Short Term Goals    STG Date to Achieve 04/19/23  -JW     STG 1 Patient to be I with HEP with " additions/changes prior to next recertification.  -     STG 1 Progress Partially Met;Ongoing  -     STG 2 Patient to be compliant with limiting L knee flexion to 90 degrees for 6 weeks post-op.  -     STG 2 Progress Ongoing;Progressing  -     STG 3 L knee flexion AROM >/= 90 degrees at 6 weeks post-op.  -     STG 3 Progress Met;Ongoing  -     STG 4 L knee extension AROM </= 3°.  -     STG 4 Progress Ongoing;Progressing  -     STG 5 Patient to perform 10 Sit to/from stand with equal weight bearing without cues from PT.  -     STG 6 Patient to perform L SLR x 20 reps with no knee lag present without cues from PT.  -     STG 6 Progress Progressing;Ongoing  -        Long Term Goals    LTG 1 I with final HEP.  -     LTG 2 Patient to ambulate with no AD >/= 1/4 mile with non-antalgic, proper heel strike with no difficulty.  -     LTG 3 L knee flexion AROM >/= 120°.  -     LTG 4 Patient to perform 30 second hold wall sit without pain or difficulty.  -     LTG 5 Patient to ascend/descend 3 steps with no HR assist or AD without no difficulty.  -     LTG 6 L LE strength 5/5.  -        Time Calculation    PT Goal Re-Cert Due Date 04/19/23  -           User Key  (r) = Recorded By, (t) = Taken By, (c) = Cosigned By    Initials Name Provider Type    Nya Riggins PTA Physical Therapist Assistant                Therapy Education  Education Details: LAQ, Prone hang  Given: HEP, Symptoms/condition management, Pain management, Mobility training  Program: New, Reinforced  How Provided: Verbal, Demonstration  Provided to: Patient  Level of Understanding: Verbalized, Demonstrated, Teach back education performed              Time Calculation:   Start Time: 1300  Stop Time: 1353  Time Calculation (min): 53 min                Nya Soriano PTA  4/10/2023

## 2023-04-12 ENCOUNTER — HOSPITAL ENCOUNTER (OUTPATIENT)
Dept: PHYSICAL THERAPY | Facility: HOSPITAL | Age: 48
Setting detail: THERAPIES SERIES
Discharge: HOME | End: 2023-04-12
Payer: COMMERCIAL

## 2023-04-12 DIAGNOSIS — S83.239A COMPLEX TEAR OF MEDIAL MENISCUS OF KNEE AS CURRENT INJURY, INITIAL ENCOUNTER: Primary | ICD-10-CM

## 2023-04-12 PROCEDURE — 97110 THERAPEUTIC EXERCISES: CPT

## 2023-04-12 NOTE — THERAPY TREATMENT NOTE
Outpatient Physical Therapy Ortho Treatment Note  [facility]     Patient Name: Roberta Hurley  : 1975  MRN: [id number]  Today's Date: 2023     Pt seen for 7 PT sessions  Reported Improvement:  N/A %  MD Visit: 2023  Recheck Date: 2023    Therapy Diagnosis:  Complex L medial meniscus repair (DOS: 3/22/2023)         Visit Date: 2023    Visit Dx:    ICD-10-CM ICD-9-CM   1. Complex tear of medial meniscus of knee as current injury, initial encounter  S83.239A 836.0       There is no problem list on file for this patient.       Past Medical History:   Diagnosis Date   • Asthma    • Cancer    • Leukemia         Past Surgical History:   Procedure Laterality Date   • HYSTERECTOMY     • MENISCECTOMY Left         PT Ortho     Row Name 23 1300       Subjective Comments    Subjective Comments Leg was sore yesterday. Calf is tight today , but no pain in knee  -       Precautions and Contraindications    Precautions/Limitations --  Medial Meniscus precautions  -    Precautions WBAT; Limit flexion to 90 degrees for 6 weeks  -    Contraindications HX of CA; No ESTIM/US; Currently has Leukemia  -       Subjective Pain    Able to rate subjective pain? yes  -    Pre-Treatment Pain Level 0  -          User Key  (r) = Recorded By, (t) = Taken By, (c) = Cosigned By    Initials Name Provider Type    Nya Riggins, ASPEN Physical Therapist Assistant                             PT Assessment/Plan     Row Name 23 1300          PT Assessment    Assessment Comments Pt is 3 weeks post-op this date .Reports no pain in knee. Gait remains antalgic however noted impovement with knee extension at beginning of session. Pt reports complaince with prone hangs at home 2-3 times a day. reports daily compliance of HEP 4-6 times a day.  Good effort with all new therex this date.  Defers ice.  No complaints of pain.  -        PT Plan    PT Frequency 3x/week  -     PT Plan  Comments Next visit add stool scoots, cont focus on full knee extension  -JW           User Key  (r) = Recorded By, (t) = Taken By, (c) = Cosigned By    Initials Name Provider Type    Nya Riggins PTA Physical Therapist Assistant                   OP Exercises     Row Name 04/12/23 1300             Subjective Comments    Subjective Comments Leg was sore yesterday. Calf is tight today , but no pain in knee  -JW         Subjective Pain    Able to rate subjective pain? yes  -JW      Pre-Treatment Pain Level 0  -JW      Post-Treatment Pain Level 0  feels like jelly  -JW         Exercise 1    Exercise Name 1 Pro II LE's full revolutions  -JW      Time 1 10 minutes  -JW      Additional Comments S+9 for 90° L 6.0  -JW         Exercise 2    Exercise Name 2 B st HS st  -JW      Reps 2 2  -JW      Time 2 30  -JW         Exercise 3    Exercise Name 3 B incline gastroc st  -JW      Reps 3 3  -JW      Time 3 30  -JW         Exercise 4    Exercise Name 4 Sit to stands  -JW      Reps 4 20  -JW      Time 4 single UE assist  -JW      Additional Comments DD under RLE  -JW         Exercise 5    Exercise Name 5 CC TKE's  -JW      Time 5 3 min with 5 sec hold in extension  -JW      Additional Comments 4 plates  -JW         Exercise 6    Exercise Name 6 CC resisted walks Fwd/Retro  -JW      Reps 6 5 laps  -JW      Additional Comments 4 plates  -JW         Exercise 7    Exercise Name 7 SL Shuttle  -JW      Time 7 5 sec hold on extension  -JW      Additional Comments 5 bands  -JW            User Key  (r) = Recorded By, (t) = Taken By, (c) = Cosigned By    Initials Name Provider Type    Nya Riggins PTA Physical Therapist Assistant                              PT OP Goals     Row Name 04/12/23 1300          PT Short Term Goals    STG Date to Achieve 04/19/23  -JW     STG 1 Patient to be I with HEP with additions/changes prior to next recertification.  -JW     STG 1 Progress Met  -JW     STG 2 Patient to be compliant with  limiting L knee flexion to 90 degrees for 6 weeks post-op.  -     STG 2 Progress Met;Ongoing  -     STG 3 L knee flexion AROM >/= 90 degrees at 6 weeks post-op.  -     STG 3 Progress Met;Ongoing  -     STG 4 L knee extension AROM </= 3°.  -     STG 4 Progress Ongoing;Progressing  -     STG 5 Patient to perform 10 Sit to/from stand with equal weight bearing without cues from PT.  -     STG 5 Progress Progressing  -     STG 6 Patient to perform L SLR x 20 reps with no knee lag present without cues from PT.  -     STG 6 Progress Progressing;Ongoing  -        Long Term Goals    LTG 1 I with final HEP.  -     LTG 2 Patient to ambulate with no AD >/= 1/4 mile with non-antalgic, proper heel strike with no difficulty.  -     LTG 3 L knee flexion AROM >/= 120°.  -     LTG 4 Patient to perform 30 second hold wall sit without pain or difficulty.  -     LTG 5 Patient to ascend/descend 3 steps with no HR assist or AD without no difficulty.  -     LTG 6 L LE strength 5/5.  -        Time Calculation    PT Goal Re-Cert Due Date 04/19/23  -           User Key  (r) = Recorded By, (t) = Taken By, (c) = Cosigned By    Initials Name Provider Type    Nya Riggins PTA Physical Therapist Assistant                               Time Calculation:   Start Time: 1300  Stop Time: 1346  Time Calculation (min): 46 min  Therapy Charges for Today     Code Description Service Date Service Provider Modifiers Qty    52607617284 HC PT THER PROC EA 15 MIN 4/12/2023 Nya Soriano PTA GP, CQ 3    10247207840 HC PT THER SUPP EA 15 MIN 4/12/2023 Nya Soriano PTA GP, CQ 1                    Nya Soriano PTA  4/12/2023

## 2023-04-14 ENCOUNTER — HOSPITAL ENCOUNTER (OUTPATIENT)
Dept: PHYSICAL THERAPY | Facility: HOSPITAL | Age: 48
Setting detail: THERAPIES SERIES
Discharge: HOME | End: 2023-04-14
Payer: COMMERCIAL

## 2023-04-14 DIAGNOSIS — S83.239A COMPLEX TEAR OF MEDIAL MENISCUS OF KNEE AS CURRENT INJURY, INITIAL ENCOUNTER: Primary | ICD-10-CM

## 2023-04-14 PROCEDURE — 97110 THERAPEUTIC EXERCISES: CPT

## 2023-04-14 NOTE — THERAPY TREATMENT NOTE
Outpatient Physical Therapy Ortho Treatment Note  [facility]     Patient Name: Roberta Hurley  : 1975  MRN: [id number]  Today's Date: 2023      Visit Date: 2023     Pt seen for 8 PT sessions  Reported Improvement:  does not rate %  MD Visit: 2023  Recheck Date: 2023     Therapy Diagnosis:  Complex L medial meniscus repair (DOS: 3/22/2023)     Visit Dx:    ICD-10-CM ICD-9-CM   1. Complex tear of medial meniscus of knee as current injury, initial encounter  S83.239A 836.0       There is no problem list on file for this patient.       Past Medical History:   Diagnosis Date   • Asthma    • Cancer    • Leukemia         Past Surgical History:   Procedure Laterality Date   • HYSTERECTOMY     • MENISCECTOMY Left         PT Ortho     Row Name 23 1200       Precautions and Contraindications    Precautions/Limitations --  medial meniscus precautions  -    Precautions WBAT; Limit flexion to 90 degrees for 6 weeks  -    Contraindications HX of CA; No ESTIM/US; Currently has Leukemia  -       Subjective Pain    Able to rate subjective pain? yes  -    Pre-Treatment Pain Level 0  -    Post-Treatment Pain Level 0  -       Left Lower Ext    Lt Knee Extension/Flexion AROM 1° from full knee extension  -          User Key  (r) = Recorded By, (t) = Taken By, (c) = Cosigned By    Initials Name Provider Type    Thelma Wesley PTA Physical Therapist Assistant                             PT Assessment/Plan     Row Name 23 1200          PT Assessment    Assessment Comments patient has improved AROM knee extension this date. gives good effort. good performance with Standing TKE's with tband resistance.  -        PT Plan    PT Frequency 3x/week  -     PT Plan Comments stool scoots next visit  -           User Key  (r) = Recorded By, (t) = Taken By, (c) = Cosigned By    Initials Name Provider Type    Thelma Wesley PTA Physical Therapist Assistant                    OP Exercises     Row Name 04/14/23 1200             Subjective Comments    Subjective Comments states that she is sore. has mowed some this week.  -         Subjective Pain    Able to rate subjective pain? yes  -      Pre-Treatment Pain Level 0  -      Post-Treatment Pain Level 0  -         Exercise 1    Exercise Name 1 Pro II LE's full revolutions  -      Time 1 10 minutes  -      Additional Comments S=9 for 90° L 6.0  -         Exercise 2    Exercise Name 2 B St. HS S  -MH      Reps 2 2  -      Time 2 30 sec hold  -         Exercise 3    Exercise Name 3 B Incline Gastroc S  -MH      Reps 3 2  -MH      Time 3 30 sec hold  -         Exercise 4    Exercise Name 4 Sit to stands  -      Reps 4 20  -MH      Additional Comments DD under RLE  -         Exercise 5    Exercise Name 5 Prone Hang  -      Time 5 5 minutes  -      Additional Comments 3#  -         Exercise 6    Exercise Name 6 Prone TKE's  -      Sets 6 2  -MH      Reps 6 15  -      Time 6 5 sec hold  -         Exercise 7    Exercise Name 7 SLR Fwd Flexion Heel on Noodle  -      Sets 7 2  -MH      Reps 7 15  -MH      Time 7 5 sec hold  -         Exercise 8    Exercise Name 8 St. TKE with tband  -      Sets 8 2  -      Reps 8 15  -MH      Time 8 5 sec hold  -      Additional Comments Red  -         Exercise 9    Exercise Name 9 Heel Walk/Retro Walk  -      Reps 9 3-4 laps  -         Exercise 10    Exercise Name 10 Step Up Fwd  -      Reps 10 20  -MH      Additional Comments 6 inch step  -         Exercise 11    Exercise Name 11 Step Up Lat  -      Reps 11 20  -MH      Additional Comments 6 inch step  -            User Key  (r) = Recorded By, (t) = Taken By, (c) = Cosigned By    Initials Name Provider Type    Thelma Wesley, PTA Physical Therapist Assistant                              PT OP Goals     Row Name 04/14/23 1200          PT Short Term Goals    STG Date to Achieve 04/19/23  -      STG 1 Patient to be I with HEP with additions/changes prior to next recertification.  -     STG 1 Progress Met  -     STG 2 Patient to be compliant with limiting L knee flexion to 90 degrees for 6 weeks post-op.  -     STG 2 Progress Met;Ongoing  -     STG 3 L knee flexion AROM >/= 90 degrees at 6 weeks post-op.  -     STG 3 Progress Met;Ongoing  -     STG 4 L knee extension AROM </= 3°.  -     STG 4 Progress Ongoing;Met  -     STG 5 Patient to perform 10 Sit to/from stand with equal weight bearing without cues from PT.  -     STG 5 Progress Progressing  -     STG 6 Patient to perform L SLR x 20 reps with no knee lag present without cues from PT.  -     STG 6 Progress Progressing;Ongoing  -        Long Term Goals    LTG 1 I with final HEP.  -     LTG 2 Patient to ambulate with no AD >/= 1/4 mile with non-antalgic, proper heel strike with no difficulty.  -     LTG 3 L knee flexion AROM >/= 120°.  -     LTG 4 Patient to perform 30 second hold wall sit without pain or difficulty.  -     LTG 5 Patient to ascend/descend 3 steps with no HR assist or AD without no difficulty.  -     LTG 6 L LE strength 5/5.  -        Time Calculation    PT Goal Re-Cert Due Date 04/19/23  -           User Key  (r) = Recorded By, (t) = Taken By, (c) = Cosigned By    Initials Name Provider Type     Thelma Mccrary PTA Physical Therapist Assistant                Therapy Education  Education Details: TKE with tband  Given: HEP, Symptoms/condition management, Pain management, Mobility training  Program: New, Reinforced  How Provided: Verbal  Provided to: Patient  Level of Understanding: Verbalized              Time Calculation:   Start Time: 1207  Stop Time: 1303  Time Calculation (min): 56 min  Total Timed Code Minutes- PT: 56 minute(s)  Therapy Charges for Today     Code Description Service Date Service Provider Modifiers Qty    21093001387 HC PT THER SUPP EA 15 MIN 4/14/2023 Thelma Mccrary PTA GP, CQ 1     [id number]  PT THER PROC EA 15 MIN 4/14/2023 Thelma Mccrary PTA GP, CQ 4                    Thelma Mccrary PTA  4/14/2023       This document has been electronically signed by Thelma Mccrary PTA on April 14, 2023 13:04 CDT

## 2023-04-17 ENCOUNTER — HOSPITAL ENCOUNTER (OUTPATIENT)
Dept: PHYSICAL THERAPY | Facility: HOSPITAL | Age: 48
Setting detail: THERAPIES SERIES
Discharge: HOME | End: 2023-04-17
Payer: COMMERCIAL

## 2023-04-17 DIAGNOSIS — S83.239A COMPLEX TEAR OF MEDIAL MENISCUS OF KNEE AS CURRENT INJURY, INITIAL ENCOUNTER: Primary | ICD-10-CM

## 2023-04-17 PROCEDURE — 97110 THERAPEUTIC EXERCISES: CPT

## 2023-04-17 NOTE — THERAPY TREATMENT NOTE
Outpatient Physical Therapy Ortho Treatment Note  [facility]     Patient Name: Roberta Hurley  : 1975  MRN: [id number]  Today's Date: 2023     Pt seen for 9 PT sessions  Reported Improvement:  N/A %  MD Visit: 2023  Recheck Date: 2023    Therapy Diagnosis:  Complex L medial meniscus repair (DOS: 3/22/2023)         Visit Date: 2023    Visit Dx:    ICD-10-CM ICD-9-CM   1. Complex tear of medial meniscus of knee as current injury, initial encounter  S83.239A 836.0       There is no problem list on file for this patient.       Past Medical History:   Diagnosis Date   • Asthma    • Cancer    • Leukemia         Past Surgical History:   Procedure Laterality Date   • HYSTERECTOMY     • MENISCECTOMY Left         PT Ortho     Row Name 23 1100       Precautions and Contraindications    Precautions/Limitations --  medial meniscus precautions  -    Precautions WBAT; Limit flexion to 90 degrees for 6 weeks  -    Contraindications HX of CA; No ESTIM/US; Currently has Leukemia  -       Subjective Pain    Post-Treatment Pain Level 0  -          User Key  (r) = Recorded By, (t) = Taken By, (c) = Cosigned By    Initials Name Provider Type    Nya Riggins PTA Physical Therapist Assistant                             PT Assessment/Plan     Row Name 23 1100          PT Assessment    Assessment Comments Pt is able to make significant improvemetns with active TKE with concentration however unable to maintain with gait.  Pt reports daily compliance with HEP and feels the knee is getting better with less pain however edema remains present.  -        PT Plan    PT Frequency 3x/week  -     PT Plan Comments Continue to progress full knee extension emphasis on gait mechanics.  -           User Key  (r) = Recorded By, (t) = Taken By, (c) = Cosigned By    Initials Name Provider Type    Nya Riggins PTA Physical Therapist Assistant                   OP Exercises      Row Name 04/17/23 1100             Subjective Comments    Subjective Comments Feeling pretty good, no pain in knee, just in calf.  -JW         Subjective Pain    Able to rate subjective pain? yes  -JW      Pre-Treatment Pain Level 0  -JW      Post-Treatment Pain Level 0  -JW         Exercise 1    Exercise Name 1 Pro II LE bike for strength and endurance  -JW      Time 1 10 minutes  -JW      Additional Comments S=9 for 90° L 7.0  -JW         Exercise 2    Exercise Name 2 B St. HS S  -JW      Reps 2 2  -JW      Time 2 30 sec hold  -JW         Exercise 3    Exercise Name 3 Foam Roll to L gastroc  -JW      Reps 3 --  -JW      Time 3 5 min  -JW         Exercise 4    Exercise Name 4 B incline gastroc st  -JW      Reps 4 2  -JW      Time 4 30  -JW         Exercise 5    Exercise Name 5 Prone Hang  -JW      Time 5 5 minutes  -JW      Additional Comments 3#  -JW         Exercise 6    Exercise Name 6 LLE Shuttle  -JW      Time 6 5 min with 5 sec hold  -JW      Additional Comments 4 bands  -JW         Exercise 7    Exercise Name 7 Stool Scoots  -JW      Reps 7 3 laps  -JW            User Key  (r) = Recorded By, (t) = Taken By, (c) = Cosigned By    Initials Name Provider Type    Nya Riggins, PTA Physical Therapist Assistant                              PT OP Goals     Row Name 04/17/23 1200 04/17/23 1100       PT Short Term Goals    STG Date to Achieve -- 04/19/23  -    STG 1 -- Patient to be I with HEP with additions/changes prior to next recertification.  -    STG 1 Progress -- Met  -    STG 2 -- Patient to be compliant with limiting L knee flexion to 90 degrees for 6 weeks post-op.  -    STG 2 Progress -- Met;Ongoing  -    STG 3 -- L knee flexion AROM >/= 90 degrees at 6 weeks post-op.  -    STG 3 Progress -- Met;Ongoing  -    STG 4 -- L knee extension AROM </= 3°.  -    STG 4 Progress -- Ongoing;Met  -    STG 5 -- Patient to perform 10 Sit to/from stand with equal weight bearing without cues from  PT.  -    STG 5 Progress -- Progressing  -    STG 6 -- Patient to perform L SLR x 20 reps with no knee lag present without cues from PT.  -    STG 6 Progress -- Progressing;Ongoing  -       Long Term Goals    LTG 1 -- I with final HEP.  -    LTG 2 -- Patient to ambulate with no AD >/= 1/4 mile with non-antalgic, proper heel strike with no difficulty.  -    LTG 3 -- L knee flexion AROM >/= 120°.  -    LTG 4 -- Patient to perform 30 second hold wall sit without pain or difficulty.  -    LTG 5 -- Patient to ascend/descend 3 steps with no HR assist or AD without no difficulty.  -    LTG 6 -- L LE strength 5/5.  -       Time Calculation    PT Goal Re-Cert Due Date 04/19/23  - --          User Key  (r) = Recorded By, (t) = Taken By, (c) = Cosigned By    Initials Name Provider Type    Nya Riggins PTA Physical Therapist Assistant                               Time Calculation:   Start Time: 1127  Stop Time: 1214  Time Calculation (min): 47 min  Therapy Charges for Today     Code Description Service Date Service Provider Modifiers Qty    43745226557 HC PT THER PROC EA 15 MIN 4/17/2023 Nya Soriano PTA GP, CQ 3    50215320483 HC PT THER SUPP EA 15 MIN 4/17/2023 Nya Soriano PTA GP, CQ 1                    Nya Soriano PTA  4/17/2023

## 2023-04-19 ENCOUNTER — HOSPITAL ENCOUNTER (OUTPATIENT)
Dept: PHYSICAL THERAPY | Facility: HOSPITAL | Age: 48
Setting detail: THERAPIES SERIES
Discharge: HOME | End: 2023-04-19
Payer: COMMERCIAL

## 2023-04-19 DIAGNOSIS — S83.239A COMPLEX TEAR OF MEDIAL MENISCUS OF KNEE AS CURRENT INJURY, INITIAL ENCOUNTER: Primary | ICD-10-CM

## 2023-04-19 PROCEDURE — 97110 THERAPEUTIC EXERCISES: CPT

## 2023-04-19 NOTE — THERAPY TREATMENT NOTE
Outpatient Physical Therapy Ortho Treatment Note  [facility]     Patient Name: Roberta Hurley  : 1975  MRN: [id number]  Today's Date: 2023     Pt seen for 10 PT sessions  Reported Improvement:  N/A %  MD Visit: 2023  Recheck Date: 2023    Therapy Diagnosis:  Complex L medial meniscus repair (DOS: 3/22/2023)         Visit Date: 2023    Visit Dx:    ICD-10-CM ICD-9-CM   1. Complex tear of medial meniscus of knee as current injury, initial encounter  S83.239A 836.0       There is no problem list on file for this patient.       Past Medical History:   Diagnosis Date   • Asthma    • Cancer    • Leukemia         Past Surgical History:   Procedure Laterality Date   • HYSTERECTOMY     • MENISCECTOMY Left         PT Ortho     Row Name 23 1000       Subjective Comments    Subjective Comments Rested the leg yesterday - feeling better - less swollen.  -       Precautions and Contraindications    Precautions/Limitations --  medial meniscus precautions  -    Precautions WBAT; Limit flexion to 90 degrees for 6 weeks  -    Contraindications HX of CA; No ESTIM/US; Currently has Leukemia  -       Subjective Pain    Able to rate subjective pain? yes  -    Pre-Treatment Pain Level 0  -          User Key  (r) = Recorded By, (t) = Taken By, (c) = Cosigned By    Initials Name Provider Type    Nya Riggins PTA Physical Therapist Assistant                             PT Assessment/Plan     Row Name 23 1100          PT Assessment    Assessment Comments Good effort with all therex.  Continues to ambulate with extension defecit.  Improving active knee extension.  no pain to report.  MD appt. next week. Defers ice.  -        PT Plan    PT Frequency 2x/week;3x/week  -     PT Plan Comments Next visit resume SL Shuttle  -           User Key  (r) = Recorded By, (t) = Taken By, (c) = Cosigned By    Initials Name Provider Type    Nya Riggins PTA Physical  "Therapist Assistant                   OP Exercises     Row Name 04/19/23 1000             Subjective Comments    Subjective Comments Rested the leg yesterday - feeling better - less swollen.  -JW         Subjective Pain    Able to rate subjective pain? yes  -JW      Pre-Treatment Pain Level 0  -JW      Post-Treatment Pain Level 0  -JW         Exercise 1    Exercise Name 1 Pro II LE bike for strength and endurance  -JW      Time 1 10 minutes  -JW      Additional Comments S=9 L 7.0  -JW         Exercise 2    Exercise Name 2 B St. HS S  -JW      Reps 2 2  -JW      Time 2 30 sec hold  -JW         Exercise 3    Exercise Name 3 B incline gastroc st  -JW      Reps 3 2  -JW      Time 3 30  -JW         Exercise 4    Exercise Name 4 ecc step downs  -JW      Sets 4 1  -JW      Reps 4 10  -JW      Time 4 4\" step  -JW      Additional Comments increased pain - dc'd  -JW         Exercise 5    Exercise Name 5 Standing HR/TR  -JW      Reps 5 20  -JW         Exercise 6    Exercise Name 6 Sit to stands  -JW      Reps 6 20  -JW      Time 6 no UE assist  -JW      Additional Comments DD under R foot  -JW         Exercise 7    Exercise Name 7 Sit to stands  -JW      Reps 7 10  -JW      Additional Comments equal WB  -JW         Exercise 8    Exercise Name 8 SLR  -JW      Reps 8 20  -JW         Exercise 9    Exercise Name 9 CC walks fwd/retro  -JW      Reps 9 5 laps  -JW      Additional Comments 5 plates  -JW         Exercise 10    Exercise Name 10 CC TKE  -JW      Reps 10 20  -JW      Additional Comments 4 plates  -JW         Exercise 11    Exercise Name 11 Track walk  -JW      Reps 11 1 lap  -JW            User Key  (r) = Recorded By, (t) = Taken By, (c) = Cosigned By    Initials Name Provider Type    Nya Riggins PTA Physical Therapist Assistant                              PT OP Goals     Row Name 04/19/23 1000          PT Short Term Goals    STG Date to Achieve 04/19/23  -JW     STG 1 Patient to be I with HEP with " additions/changes prior to next recertification.  -     STG 1 Progress Met  -     STG 2 Patient to be compliant with limiting L knee flexion to 90 degrees for 6 weeks post-op.  -     STG 2 Progress Met;Ongoing  -     STG 3 L knee flexion AROM >/= 90 degrees at 6 weeks post-op.  -     STG 3 Progress Met;Ongoing  -     STG 4 L knee extension AROM </= 3°.  -     STG 4 Progress Ongoing;Met  -     STG 5 Patient to perform 10 Sit to/from stand with equal weight bearing without cues from PT.  -     STG 5 Progress Met  -     STG 6 Patient to perform L SLR x 20 reps with no knee lag present without cues from PT.  -     STG 6 Progress Met  -        Long Term Goals    LTG 1 I with final HEP.  -     LTG 2 Patient to ambulate with no AD >/= 1/4 mile with non-antalgic, proper heel strike with no difficulty.  -     LTG 3 L knee flexion AROM >/= 120°.  -     LTG 4 Patient to perform 30 second hold wall sit without pain or difficulty.  -     LTG 5 Patient to ascend/descend 3 steps with no HR assist or AD without no difficulty.  -     LTG 6 L LE strength 5/5.  -        Time Calculation    PT Goal Re-Cert Due Date 04/19/23  -           User Key  (r) = Recorded By, (t) = Taken By, (c) = Cosigned By    Initials Name Provider Type    Nya Riggins PTA Physical Therapist Assistant                               Time Calculation:   Start Time: 1038  Stop Time: 1134  Time Calculation (min): 56 min  Therapy Charges for Today     Code Description Service Date Service Provider Modifiers Qty    26931322048 HC PT THER PROC EA 15 MIN 4/19/2023 Nya Soriano PTA GP, CQ 4    82659547217 HC PT THER SUPP EA 15 MIN 4/19/2023 Nya Soriano PTA GP, CQ 1                    Nya Soriano PTA  4/19/2023

## 2023-04-21 ENCOUNTER — HOSPITAL ENCOUNTER (OUTPATIENT)
Dept: PHYSICAL THERAPY | Facility: HOSPITAL | Age: 48
Setting detail: THERAPIES SERIES
Discharge: HOME | End: 2023-04-21
Payer: COMMERCIAL

## 2023-04-21 DIAGNOSIS — S83.239A COMPLEX TEAR OF MEDIAL MENISCUS OF KNEE AS CURRENT INJURY, INITIAL ENCOUNTER: Primary | ICD-10-CM

## 2023-04-21 PROCEDURE — 97110 THERAPEUTIC EXERCISES: CPT

## 2023-04-21 PROCEDURE — 97140 MANUAL THERAPY 1/> REGIONS: CPT

## 2023-04-21 NOTE — THERAPY TREATMENT NOTE
Outpatient Physical Therapy Ortho Treatment Note  [facility]     Patient Name: Roberta Hurley  : 1975  MRN: [id number]  Today's Date: 2023     Pt seen for 11 PT sessions  Reported Improvement:  60 %  MD Visit: 2023  Recheck Date: 2023    Therapy Diagnosis:  Complex L medial meniscus repair (DOS: 3/22/2023)         Visit Date: 2023    Visit Dx:    ICD-10-CM ICD-9-CM   1. Complex tear of medial meniscus of knee as current injury, initial encounter  S83.239A 836.0       There is no problem list on file for this patient.       Past Medical History:   Diagnosis Date   • Asthma    • Cancer    • Leukemia         Past Surgical History:   Procedure Laterality Date   • HYSTERECTOMY     • MENISCECTOMY Left         PT Ortho     Row Name 23 1100       Precautions and Contraindications    Precautions/Limitations --  medial meniscus precautions  -    Precautions WBAT; Limit flexion to 90 degrees for 6 weeks  -    Contraindications HX of CA; No ESTIM/US; Currently has Leukemia  -          User Key  (r) = Recorded By, (t) = Taken By, (c) = Cosigned By    Initials Name Provider Type    Nya Riggins PTA Physical Therapist Assistant                             PT Assessment/Plan     Row Name 23 1100          PT Assessment    Assessment Comments Pt reports tenderness in posterior / medial joint line region with prone hang. IASTM to area with fair tolerance.  Pt reports increased pain during and after tx however is able to demonstrate active full extension in standing and step ups.  Ice to go post tx .  Follow up with ortho next week.  -        PT Plan    PT Frequency 3x/week;2x/week  -     PT Plan Comments Shuttle next visit.  continue with manual  -JOSIANE           User Key  (r) = Recorded By, (t) = Taken By, (c) = Cosigned By    Initials Name Provider Type    Nya Riggins PTA Physical Therapist Assistant                   OP Exercises     Row Name  "04/21/23 1100             Subjective Comments    Subjective Comments Feeling stiff and tired today - just woke up, have not done much with my knee yet today.  -JW         Subjective Pain    Able to rate subjective pain? yes  -JW      Pre-Treatment Pain Level 0  -JW      Post-Treatment Pain Level 3  -JW         Exercise 1    Exercise Name 1 Pro II LE bike for strength and endurance  -JW      Time 1 10 minutes  -JW      Additional Comments S=9 L 7.0  -JW         Exercise 2    Exercise Name 2 B St. HS S  -JW      Reps 2 2  -JW      Time 2 30 sec hold  -JW         Exercise 3    Exercise Name 3 Prone Hang  -JW      Time 3 5 min  -JW      Additional Comments 5# overpressure  -JW         Exercise 4    Exercise Name 4 Manual  -JW         Exercise 5    Exercise Name 5 Reciprocal stairs  -JW      Reps 5 10 laps  -JW         Exercise 6    Exercise Name 6 Fwd step ups with RLE advanced to top step  -JW      Reps 6 20  -JW      Time 6 6\" step  -JW      Additional Comments No HR assist emphasis on TKE  -JW         Exercise 7    Exercise Name 7 LAQ  -JW      Reps 7 20  -JW      Time 7 5\" hold  -JW         Exercise 8    Exercise Name 8 Track walk  -JW      Reps 8 2 laps  -JW            User Key  (r) = Recorded By, (t) = Taken By, (c) = Cosigned By    Initials Name Provider Type    Nya Riggins PTA Physical Therapist Assistant                         Manual Rx (last 36 hours)     Manual Treatments     Row Name 04/21/23 1100             Manual Rx 1    Manual Rx 1 Location L posterior knee  -JW      Manual Rx 1 Type IASTM/STM/MFR  -JW      Manual Rx 1 Duration 14 minutes  -JW            User Key  (r) = Recorded By, (t) = Taken By, (c) = Cosigned By    Initials Name Provider Type    Nya Riggins PTA Physical Therapist Assistant                 PT OP Goals     Row Name 04/21/23 1100          PT Short Term Goals    STG Date to Achieve 04/19/23  -JW     STG 1 Patient to be I with HEP with additions/changes prior to next " recertification.  -     STG 1 Progress Met  -     STG 2 Patient to be compliant with limiting L knee flexion to 90 degrees for 6 weeks post-op.  -     STG 2 Progress Met;Ongoing  -     STG 3 L knee flexion AROM >/= 90 degrees at 6 weeks post-op.  -     STG 3 Progress Met;Ongoing  -     STG 4 L knee extension AROM </= 3°.  -     STG 4 Progress Ongoing;Met  -     STG 5 Patient to perform 10 Sit to/from stand with equal weight bearing without cues from PT.  -     STG 5 Progress Met  -     STG 6 Patient to perform L SLR x 20 reps with no knee lag present without cues from PT.  -     STG 6 Progress Met  -        Long Term Goals    LTG 1 I with final HEP.  -     LTG 2 Patient to ambulate with no AD >/= 1/4 mile with non-antalgic, proper heel strike with no difficulty.  -     LTG 2 Progress Progressing;Ongoing  -     LTG 3 L knee flexion AROM >/= 120°.  -     LTG 4 Patient to perform 30 second hold wall sit without pain or difficulty.  -     LTG 5 Patient to ascend/descend 3 steps with no HR assist or AD without no difficulty.  -     LTG 5 Progress Progressing  -     LTG 6 L LE strength 5/5.  -        Time Calculation    PT Goal Re-Cert Due Date 04/19/23  -           User Key  (r) = Recorded By, (t) = Taken By, (c) = Cosigned By    Initials Name Provider Type    Nya Riggins PTA Physical Therapist Assistant                               Time Calculation:   Start Time: 1045  Stop Time: 1143  Time Calculation (min): 58 min  Therapy Charges for Today     Code Description Service Date Service Provider Modifiers Qty    12274690516 HC PT MANUAL THERAPY EA 15 MIN 4/21/2023 Nya Soriano PTA GP, CQ 1    43415419544 HC PT THER PROC EA 15 MIN 4/21/2023 Nya Soriano PTA GP, CQ 3    06730604183 HC PT THER SUPP EA 15 MIN 4/21/2023 Nya Soriano PTA GP, CQ 1                    Nya Soriano PTA  4/21/2023

## 2023-04-26 ENCOUNTER — HOSPITAL ENCOUNTER (OUTPATIENT)
Dept: PHYSICAL THERAPY | Facility: HOSPITAL | Age: 48
Setting detail: THERAPIES SERIES
Discharge: HOME | End: 2023-04-26
Payer: COMMERCIAL

## 2023-04-26 DIAGNOSIS — S83.239A COMPLEX TEAR OF MEDIAL MENISCUS OF KNEE AS CURRENT INJURY, INITIAL ENCOUNTER: Primary | ICD-10-CM

## 2023-04-26 PROCEDURE — 97530 THERAPEUTIC ACTIVITIES: CPT | Performed by: PHYSICAL THERAPIST

## 2023-04-26 PROCEDURE — 97110 THERAPEUTIC EXERCISES: CPT | Performed by: PHYSICAL THERAPIST

## 2023-04-26 NOTE — THERAPY PROGRESS REPORT/RE-CERT
Outpatient Physical Therapy Ortho Progress Note  [facility]     Patient Name: Roberta Hurley  : 1975  MRN: [id number]  Today's Date: 2023      Visit Date: 2023    Patient seen for 12 PT sessions.  Patient reports 60-65% of improvement.  Next MD appt: 6 weeks.  Recertification: 2023.    Therapy Diagnosis: Complex L medial meniscus repair (DOS: 3/22/2023)         Past Medical History:   Diagnosis Date   • Asthma    • Cancer    • Leukemia         Past Surgical History:   Procedure Laterality Date   • HYSTERECTOMY     • MENISCECTOMY Left        Visit Dx:     ICD-10-CM ICD-9-CM   1. Complex tear of medial meniscus of knee as current injury, initial encounter  S83.239A 836.0              PT Ortho     Row Name 23 1100       Subjective Comments    Subjective Comments Patient reports she saw the doctor yesterday and he took her ROM restirction away so she scooted her home bike up.  -AJ       Precautions and Contraindications    Precautions/Limitations no known precautions/limitations  -AJ    Contraindications HX of CA; No ESTIM/US; Currently has Leukemia  -       Subjective Pain    Able to rate subjective pain? yes  -AJ    Pre-Treatment Pain Level 0  -AJ       Posture/Observations    Genu valgus Bilateral:;Normal  -AJ    Genu varus Bilateral:;Normal  -AJ    Observations Edema  -AJ    Posture/Observations Comments No distress, no assistive device, wearing B tennis shoes.  -AJ       Left Lower Ext    Lt Knee Extension/Flexion AROM 1°-111°  -AJ       MMT (Manual Muscle Testing)    General MMT Comments B LE 5/5, except L QS 4+/5, L HS 4+/5  -AJ       Sensation    Sensation WNL? WNL  -AJ    Light Touch No apparent deficits  -AJ       Lower Extremity Flexibility    Hamstrings Left:;Mildly limited  -AJ       RLE Quick Girth (cm)    Other 1 42.6 cm  knee joint line  -AJ    RLE Quick Girth Total 42.6  -AJ       LLE Quick Girth (cm)    Other 1 48.4 cm  knee joint line  -AJ        Pathomechanics    Lower Extremity Pathomechanics Antalgic with midstance  -AJ       Transfers    Sit-Stand Esmeralda (Transfers) independent  -AJ    Stand-Sit Esmeralda (Transfers) independent  -AJ    Comment, (Transfers) = WB B LEs  -AJ       Gait/Stairs (Locomotion)    Esmeralda Level (Gait) independent  -AJ    Pattern (Gait) step-through  -AJ    Left Sided Gait Deviations decreased knee extension;heel strike decreased  -AJ    Negotiation (Stairs) stairs independence  -AJ    Esmeralda Level (Stairs) independent  -AJ    Handrail Location (Stairs) none  -AJ    Number of Steps (Stairs) 30  -AJ    Ascending Technique (Stairs) step-over-step  -AJ    Descending Technique (Stairs) step-over-step  -AJ    Stairs, Impairments ROM decreased;strength decreased  -AJ    Comment, (Gait/Stairs) Difficulty with eccentric control on descent of stairs.  -AJ          User Key  (r) = Recorded By, (t) = Taken By, (c) = Cosigned By    Initials Name Provider Type    Danyell Dixon, PT DPT Physical Therapist                            Therapy Education  Education Details: HEP: continue with stretches. go andrea 90° with heel slides  Given: HEP, Symptoms/condition management  Program: Reinforced, Progressed  How Provided: Verbal  Provided to: Patient  Level of Understanding: Teach back education performed, Verbalized, Demonstrated      PT OP Goals     Row Name 04/26/23 1100          PT Short Term Goals    STG Date to Achieve 04/19/23  -     STG 1 Patient to be I with HEP with additions/changes prior to next recertification.  -AJ     STG 1 Progress Met  -     STG 2 Patient to be compliant with limiting L knee flexion to 90 degrees for 6 weeks post-op.  -AJ     STG 2 Progress Met  -     STG 3 L knee flexion AROM >/= 90 degrees at 6 weeks post-op.  -AJ     STG 3 Progress Met  -     STG 4 L knee extension AROM </= 3°.  -AJ     STG 4 Progress Met  -     STG 5 Patient to perform 10 Sit to/from stand with equal  weight bearing without cues from PT.  -     STG 5 Progress Met  -     STG 6 Patient to perform L SLR x 20 reps with no knee lag present without cues from PT.  -     STG 6 Progress Met  -        Long Term Goals    LTG 1 I with final HEP.  -     LTG 2 Patient to ambulate with no AD >/= 1/4 mile with non-antalgic, proper heel strike with no difficulty.  -     LTG 2 Progress Progressing;Ongoing  -     LTG 3 L knee flexion AROM >/= 120°.  -     LTG 3 Progress Ongoing;Progressing  -     LTG 4 Patient to perform 30 second hold wall sit without pain or difficulty.  -     LTG 4 Progress Ongoing;Progressing  -     LTG 5 Patient to ascend/descend 3 steps with no HR assist or AD without no difficulty.  -     LTG 5 Progress Partially Met;Ongoing;Progressing  -     LTG 5 Progress Comments Hesitant on the descent.  -     LTG 6 L LE strength 5/5.  -     LTG 6 Progress Ongoing;Progressing  -        Time Calculation    PT Goal Re-Cert Due Date 05/17/23  -           User Key  (r) = Recorded By, (t) = Taken By, (c) = Cosigned By    Initials Name Provider Type    Danyell Dixon, PT DPT Physical Therapist              Barriers to Rehab: Include significant or possible arthritic/degenerative changes that have occurred within the joint.    Safety Issues:Cancer history, No estim/US         PT Assessment/Plan     Row Name 04/26/23 1100          PT Assessment    Functional Limitations Impaired gait;Impaired locomotion;Limitation in home management;Limitations in community activities;Limitations in functional capacity and performance;Performance in leisure activities;Performance in self-care ADL;Performance in work activities  -     Impairments Balance;Gait;Coordination;Poor body mechanics;Pain;Range of motion;Muscle strength;Impaired muscle endurance;Impaired flexibility  -     Assessment Comments Patient is progressing well overall. Still has extension deficit. patint cleared by MD to progress  flexion and had significant increase today. Patient still struggles with eccentric portion of stairs.  -AJ     Rehab Potential Good  -AJ     Patient/caregiver participated in establishment of treatment plan and goals Yes  -AJ     Patient would benefit from skilled therapy intervention Yes  -AJ        PT Plan    PT Frequency 3x/week;2x/week  Drop to 2x/wk after next week.  -AJ     Predicted Duration of Therapy Intervention (PT) 8-10 more visits  -AJ     PT Plan Comments Work on gait next session.  -AJ           User Key  (r) = Recorded By, (t) = Taken By, (c) = Cosigned By    Initials Name Provider Type    Danyell Dixon, PT DPT Physical Therapist            Other therapeutic activities and/or exercises will be prescribed depending on the patient's progress or lack thereof.    Continue skilled therapy plan of care to meet remaining unmet goals. Therapy to focus on ROM, gait, strength to elivate gait deviatins and restore normal function.       Modalities     Row Name 04/26/23 1100             Ice    Patient denies application of Ice Yes  -AJ      Patient reports will apply ice at home to involved area Yes  -AJ            User Key  (r) = Recorded By, (t) = Taken By, (c) = Cosigned By    Initials Name Provider Type    Danyell Dixon, PT DPT Physical Therapist               OP Exercises     Row Name 04/26/23 1100             Subjective Comments    Subjective Comments Patient reports she saw the doctor yesterday and he took her ROM restirction away so she scooted her home bike up.  -AJ         Subjective Pain    Able to rate subjective pain? yes  -AJ      Pre-Treatment Pain Level 0  -AJ      Post-Treatment Pain Level 5  -AJ         Exercise 1    Exercise Name 1 Pro II LE bike for strength and endurance  -AJ      Time 1 10 minutes  -AJ         Exercise 2    Exercise Name 2 B St. HS S  -AJ      Reps 2 2  -AJ      Time 2 30 sec hold  -AJ         Exercise 3    Exercise Name 3 L St. lunge S  -AJ      Time  "3 10-15\" holds for 3 min  -AJ         Exercise 4    Exercise Name 4 3 reciprocal steps  -AJ      Sets 4 1  -AJ      Reps 4 10  -AJ      Additional Comments no HRA  -AJ         Exercise 5    Exercise Name 5 Sit to/from stand  -AJ      Sets 5 1  -AJ      Reps 5 20  -AJ      Additional Comments no UE A  -AJ         Exercise 6    Exercise Name 6 heel slides with strap  -AJ      Sets 6 1  -AJ      Reps 6 15  -AJ      Time 6 5\" hold  -AJ         Exercise 7    Exercise Name 7 meaaurements  -AJ         Exercise 8    Exercise Name 8 Shuttle: 1L press  -AJ      Time 8 3 min  -AJ      Additional Comments 5 cords  -AJ         Exercise 9    Exercise Name 9 Wall sits  -AJ      Sets 9 1  -AJ      Reps 9 10  -AJ      Time 9 10\" hold  -AJ         Exercise 10    Exercise Name 10 ambulation around clinic  -AJ            User Key  (r) = Recorded By, (t) = Taken By, (c) = Cosigned By    Initials Name Provider Type    Danyell Dixon, PT DPT Physical Therapist               All therapeutic interventions performed today were to address current functional limitations and/or deficits in addressing all physical therapy goals.                 Outcome Measure Options: Lower Extremity Functional Scale (LEFS)  Lower Extremity Functional Index  Any of your usual work, housework or school activities: Moderate difficulty  Your usual hobbies, recreational or sporting activities: Quite a bit of difficulty  Getting into or out of the bath: A little bit of difficulty  Walking between rooms: A little bit of difficulty  Putting on your shoes or socks: A little bit of difficulty  Squatting: Extreme difficulty or unable to perform activity  Lifting an object, like a bag of groceries from the floor: Moderate difficulty  Performing light activities around your home: Moderate difficulty  Performing heavy activities around your home: Extreme difficulty or unable to perform activity  Getting into or out of a car: Moderate difficulty  Walking 2 blocks: " Moderate difficulty  Walking a mile: Quite a bit of difficulty  Going up or down 10 stairs (about 1 flight of stairs): Quite a bit of difficulty  Standing for 1 hour: Quite a bit of difficulty  Sitting for 1 hour: No difficulty  Running on even ground: Extreme difficulty or unable to perform activity  Running on uneven ground: Extreme difficulty or unable to perform activity  Making sharp turns while running fast: Extreme difficulty or unable to perform activity  Hopping: Extreme difficulty or unable to perform activity  Rolling over in bed: Moderate difficulty  Total: 29      Time Calculation:     Start Time: 1131  Stop Time: 1216  Time Calculation (min): 45 min  Total Timed Code Minutes- PT: 45 minute(s)     Therapy Charges for Today     Code Description Service Date Service Provider Modifiers Qty    28864531618 HC PT THER SUPP EA 15 MIN 4/26/2023 Danyell Merida, PT DPT GP 1    37267466742 HC PT THERAPEUTIC ACT EA 15 MIN 4/26/2023 Danyell Merida, PT DPT GP 1    [id number]  PT THER PROC EA 15 MIN 4/26/2023 Danyell Merida, PT DPT GP 2          PT G-Codes  Outcome Measure Options: Lower Extremity Functional Scale (LEFS)  Total: 29       This document has been electronically signed by Danyell Merida PT RUDIT, [facility] on April 26, 2023 13:31 CDT

## 2023-04-27 ENCOUNTER — APPOINTMENT (OUTPATIENT)
Dept: PHYSICAL THERAPY | Facility: HOSPITAL | Age: 48
End: 2023-04-27
Payer: COMMERCIAL

## 2023-04-28 ENCOUNTER — HOSPITAL ENCOUNTER (OUTPATIENT)
Dept: PHYSICAL THERAPY | Facility: HOSPITAL | Age: 48
Setting detail: THERAPIES SERIES
Discharge: HOME | End: 2023-04-28
Payer: COMMERCIAL

## 2023-04-28 DIAGNOSIS — S83.239A COMPLEX TEAR OF MEDIAL MENISCUS OF KNEE AS CURRENT INJURY, INITIAL ENCOUNTER: Primary | ICD-10-CM

## 2023-04-28 PROCEDURE — 97116 GAIT TRAINING THERAPY: CPT

## 2023-04-28 PROCEDURE — 97110 THERAPEUTIC EXERCISES: CPT

## 2023-04-28 NOTE — THERAPY TREATMENT NOTE
Outpatient Physical Therapy Ortho Treatment Note  [facility]     Patient Name: Roberta Hurley  : 1975  MRN: [id number]  Today's Date: 2023     Pt seen for 13 PT sessions  Reported Improvement:  60-65 %  MD Visit: 2023  Recheck Date: 2023    Therapy Diagnosis:  Complex L medial meniscus repair (DOS: 3/22/2023)        Visit Date: 2023    Visit Dx:    ICD-10-CM ICD-9-CM   1. Complex tear of medial meniscus of knee as current injury, initial encounter  S83.239A 836.0       There is no problem list on file for this patient.       Past Medical History:   Diagnosis Date   • Asthma    • Cancer    • Leukemia         Past Surgical History:   Procedure Laterality Date   • HYSTERECTOMY     • MENISCECTOMY Left         PT Ortho     Row Name 23 0900       Subjective Comments    Subjective Comments Just got off work,  knee feels tight and swollen  -       Precautions and Contraindications    Precautions/Limitations no known precautions/limitations  -    Contraindications HX of CA; No ESTIM/US; Currently has Leukemia  -       Subjective Pain    Able to rate subjective pain? yes  -JW    Pre-Treatment Pain Level 3  -          User Key  (r) = Recorded By, (t) = Taken By, (c) = Cosigned By    Initials Name Provider Type    Nya Riggins PTA Physical Therapist Assistant                             PT Assessment/Plan     Row Name 23 1100          PT Assessment    Assessment Comments Pt reporting some increased pain in anterior knee on bike and posterior knee with extension.  Statess she has been working/driving all night.  Good effort with all therex. ROM maintained from last PT, Ice to go  -        PT Plan    PT Frequency 3x/week;2x/week  -     PT Plan Comments Continuw to smooth gait  -           User Key  (r) = Recorded By, (t) = Taken By, (c) = Cosigned By    Initials Name Provider Type    Nya Riggins PTA Physical Therapist Assistant           "         OP Exercises     Row Name 04/28/23 0900             Subjective Comments    Subjective Comments Just got off work,  knee feels tight and swollen  -JW         Subjective Pain    Able to rate subjective pain? yes  -JW      Pre-Treatment Pain Level 3  -JW      Post-Treatment Pain Level 3  -JW         Exercise 1    Exercise Name 1 Pro II LE bike for strength and endurance  -JW      Time 1 10 min  -JW      Additional Comments S=5 L 5.0  -JW         Exercise 2    Exercise Name 2 B St. HS S  -JW      Reps 2 2  -JW      Time 2 30 sec hold  -JW         Exercise 3    Exercise Name 3 L St. lunge S  -JW      Time 3 10-15\" holds for 3 min  -JW         Exercise 4    Exercise Name 4 LLE shuttle  -JW      Reps 4 5  -JW      Time 4 3 min  -JW      Additional Comments 5 bands  -JW         Exercise 5    Exercise Name 5 Heel Slides  -JW      Time 5 5 min  -JW      Additional Comments strap assist  -JW         Exercise 6    Exercise Name 6 heel prop  -JW      Time 6 5 min  -JW      Additional Comments 6# overpressure  -JW         Exercise 7    Exercise Name 7 Gait - high knees with opp HS curl  -JW      Time 7 100'  -JW            User Key  (r) = Recorded By, (t) = Taken By, (c) = Cosigned By    Initials Name Provider Type    Nya Riggins, PTA Physical Therapist Assistant                              PT OP Goals     Row Name 04/28/23 1200 04/28/23 0900       PT Short Term Goals    STG Date to Achieve -- 04/19/23  -    STG 1 -- Patient to be I with HEP with additions/changes prior to next recertification.  -    STG 1 Progress -- Met  -    STG 2 -- Patient to be compliant with limiting L knee flexion to 90 degrees for 6 weeks post-op.  -    STG 2 Progress -- Met  -    STG 3 -- L knee flexion AROM >/= 90 degrees at 6 weeks post-op.  -    STG 3 Progress -- Met  -    STG 4 -- L knee extension AROM </= 3°.  -    STG 4 Progress -- Met  -    STG 5 -- Patient to perform 10 Sit to/from stand with equal weight " bearing without cues from PT.  -    STG 5 Progress -- Met  -    STG 6 -- Patient to perform L SLR x 20 reps with no knee lag present without cues from PT.  -    STG 6 Progress -- Met  -       Long Term Goals    LTG 1 -- I with final HEP.  -    LTG 2 -- Patient to ambulate with no AD >/= 1/4 mile with non-antalgic, proper heel strike with no difficulty.  -    LTG 2 Progress -- Progressing;Ongoing  -    LTG 3 -- L knee flexion AROM >/= 120°.  -    LTG 3 Progress -- Ongoing;Progressing  -    LTG 4 -- Patient to perform 30 second hold wall sit without pain or difficulty.  -    LTG 4 Progress -- Ongoing;Progressing  -    LTG 5 -- Patient to ascend/descend 3 steps with no HR assist or AD without no difficulty.  -    LTG 5 Progress -- Partially Met;Ongoing;Progressing  -    LTG 6 -- L LE strength 5/5.  -    LTG 6 Progress -- Ongoing;Progressing  -       Time Calculation    PT Goal Re-Cert Due Date 05/17/23  - 05/17/23  -          User Key  (r) = Recorded By, (t) = Taken By, (c) = Cosigned By    Initials Name Provider Type    Nya Riggins PTA Physical Therapist Assistant                               Time Calculation:   Start Time: 1000  Stop Time: 1053  Time Calculation (min): 53 min  Therapy Charges for Today     Code Description Service Date Service Provider Modifiers Qty    00750438090 HC GAIT TRAINING EA 15 MIN 4/28/2023 Nya Soriano PTA GP, CQ 1    06240831429 HC PT THER PROC EA 15 MIN 4/28/2023 Nya Soriano PTA GP, CQ 3    23278306029 HC PT THER SUPP EA 15 MIN 4/28/2023 Nya Soriano PTA GP, CQ 1                    Nya Soriano PTA  4/28/2023

## 2023-05-01 ENCOUNTER — APPOINTMENT (OUTPATIENT)
Dept: PHYSICAL THERAPY | Facility: HOSPITAL | Age: 48
End: 2023-05-01
Payer: COMMERCIAL

## 2023-05-02 ENCOUNTER — HOSPITAL ENCOUNTER (OUTPATIENT)
Dept: PHYSICAL THERAPY | Facility: HOSPITAL | Age: 48
Setting detail: THERAPIES SERIES
Discharge: HOME | End: 2023-05-02
Payer: COMMERCIAL

## 2023-05-02 DIAGNOSIS — S83.239A COMPLEX TEAR OF MEDIAL MENISCUS OF KNEE AS CURRENT INJURY, INITIAL ENCOUNTER: Primary | ICD-10-CM

## 2023-05-02 PROCEDURE — 97110 THERAPEUTIC EXERCISES: CPT

## 2023-05-02 NOTE — THERAPY TREATMENT NOTE
Outpatient Physical Therapy Ortho Treatment Note  [facility]     Patient Name: Roberta Hurley  : 1975  MRN: [id number]  Today's Date: 2023     Pt seen for 14 PT sessions  Reported Improvement:  60-65 %  MD Visit: 2023  Recheck Date: 2023    Therapy Diagnosis:  Complex L medial meniscus repair (DOS: 3/22/2023)        Visit Date: 2023    Visit Dx:    ICD-10-CM ICD-9-CM   1. Complex tear of medial meniscus of knee as current injury, initial encounter  S83.239A 836.0       There is no problem list on file for this patient.       Past Medical History:   Diagnosis Date   • Asthma    • Cancer    • Leukemia         Past Surgical History:   Procedure Laterality Date   • HYSTERECTOMY     • MENISCECTOMY Left         PT Ortho     Row Name 23 1400       Subjective Comments    Subjective Comments No pain - been doing a little more work wihtout any issue.  -       Precautions and Contraindications    Precautions/Limitations no known precautions/limitations  -    Contraindications HX of CA; No ESTIM/US; Currently has Leukemia  -JW       Subjective Pain    Able to rate subjective pain? yes  -    Pre-Treatment Pain Level 0  -JW       Left Lower Ext    Lt Knee Extension/Flexion AROM 0-115°  -          User Key  (r) = Recorded By, (t) = Taken By, (c) = Cosigned By    Initials Name Provider Type    Nya Riggins PTA Physical Therapist Assistant                             PT Assessment/Plan     Row Name 23 1600          PT Assessment    Assessment Comments Pt reports improveing funcational abilities.  Working more hours without difficulty and getting more confident.  Continues to present with a visible knee extension defecit without focus on extending knee with heel strike.  Pt is able to demonstrate after cueing.  Good effort with all therex.  Reports some tightness across anterior joint space.  ROM 0-115° this date  -        PT Plan    PT Frequency 2x/week   "-JW     PT Plan Comments Next visit add SL Shuttle  -JW           User Key  (r) = Recorded By, (t) = Taken By, (c) = Cosigned By    Initials Name Provider Type    Nya Riggins PTA Physical Therapist Assistant                   OP Exercises     Row Name 05/02/23 1400             Subjective Comments    Subjective Comments No pain - been doing a little more work wihtout any issue.  -JW         Subjective Pain    Able to rate subjective pain? yes  -JW      Pre-Treatment Pain Level 0  -JW      Post-Treatment Pain Level 0  -JW         Exercise 1    Exercise Name 1 EFX  -JW      Time 1 10 min  -JW      Additional Comments L 4 Quick start  -JW         Exercise 2    Exercise Name 2 B St. HS S  -JW      Reps 2 2  -JW      Time 2 30 sec hold  -JW         Exercise 3    Exercise Name 3 B incline gastroc st  -JW      Reps 3 2  -JW      Time 3 30  -JW         Exercise 4    Exercise Name 4 L HS st with IR/ER  -JW      Reps 4 30 ea direction  -JW         Exercise 5    Exercise Name 5 CC walkouts  -JW      Reps 5 5 ea direction  -JW      Additional Comments 5 plates  -JW         Exercise 6    Exercise Name 6 Reciprocal Stairs  -JW      Reps 6 10  -JW      Additional Comments cues for proper mechanics  -JW         Exercise 7    Exercise Name 7 Ecc step downs  -JW      Sets 7 2  -JW      Reps 7 10  -JW      Time 7 6\" step  -JW         Exercise 8    Exercise Name 8 Heel Prop  -JW      Time 8 4 min  -JW      Additional Comments 5# overpressure  -JW         Exercise 9    Exercise Name 9 Heel Slides  -JW      Time 9 3 min  -JW         Exercise 10    Exercise Name 10 Lunge st on top step  -JW      Reps 10 10  -JW      Time 10 10  -JW         Exercise 11    Exercise Name 11 ice to go for home use.  -JW            User Key  (r) = Recorded By, (t) = Taken By, (c) = Cosigned By    Initials Name Provider Type    JOSIANE Nya Soriano PTA Physical Therapist Assistant                              PT OP Goals     Row Name 05/02/23 1500 05/02/23 " 1400       PT Short Term Goals    STG Date to Achieve 04/19/23  -JW --    STG 1 Patient to be I with HEP with additions/changes prior to next recertification.  -JW --    STG 1 Progress Met  -JW --    STG 2 Patient to be compliant with limiting L knee flexion to 90 degrees for 6 weeks post-op.  -JW --    STG 2 Progress Met  -JW --    STG 3 L knee flexion AROM >/= 90 degrees at 6 weeks post-op.  -JW --    STG 3 Progress Met  -JW --    STG 4 L knee extension AROM </= 3°.  -JW --    STG 4 Progress Met  -JW --    STG 5 Patient to perform 10 Sit to/from stand with equal weight bearing without cues from PT.  -JW --    STG 5 Progress Met  -JW --    STG 6 Patient to perform L SLR x 20 reps with no knee lag present without cues from PT.  -JW --    STG 6 Progress Met  -JW --       Long Term Goals    LTG 1 I with final HEP.  -JW --    LTG 2 Patient to ambulate with no AD >/= 1/4 mile with non-antalgic, proper heel strike with no difficulty.  -JW --    LTG 2 Progress Progressing;Ongoing  -JW --    LTG 3 L knee flexion AROM >/= 120°.  -JW --    LTG 3 Progress Ongoing;Progressing  -JW --    LTG 4 Patient to perform 30 second hold wall sit without pain or difficulty.  -JW --    LTG 4 Progress Ongoing;Progressing  -JW --    LTG 5 Patient to ascend/descend 3 steps with no HR assist or AD without no difficulty.  -JW --    LTG 5 Progress Partially Met;Ongoing;Progressing  -JW --    LTG 6 L LE strength 5/5.  -JW --    LTG 6 Progress Ongoing;Progressing  -JW --       Time Calculation    PT Goal Re-Cert Due Date -- 05/17/23  -JOSIANE          User Key  (r) = Recorded By, (t) = Taken By, (c) = Cosigned By    Initials Name Provider Type    Nya Riggins PTA Physical Therapist Assistant                               Time Calculation:   Start Time: 1430  Stop Time: 1533  Time Calculation (min): 63 min  Therapy Charges for Today     Code Description Service Date Service Provider Modifiers Qty    84551952614  PT THER PROC EA 15 MIN 5/2/2023  Nya Soriano, ASPEN GP, CQ 4    95427380070 HC PT THER SUPP EA 15 MIN 5/2/2023 Nya Soriano PTA GP, CQ 1                    Nya Soriano PTA  5/2/2023

## 2023-05-03 ENCOUNTER — APPOINTMENT (OUTPATIENT)
Dept: PHYSICAL THERAPY | Facility: HOSPITAL | Age: 48
End: 2023-05-03
Payer: COMMERCIAL

## 2023-05-05 ENCOUNTER — HOSPITAL ENCOUNTER (OUTPATIENT)
Dept: PHYSICAL THERAPY | Facility: HOSPITAL | Age: 48
Setting detail: THERAPIES SERIES
Discharge: HOME | End: 2023-05-05
Payer: COMMERCIAL

## 2023-05-05 DIAGNOSIS — S83.239A COMPLEX TEAR OF MEDIAL MENISCUS OF KNEE AS CURRENT INJURY, INITIAL ENCOUNTER: Primary | ICD-10-CM

## 2023-05-05 PROCEDURE — 97110 THERAPEUTIC EXERCISES: CPT

## 2023-05-05 NOTE — THERAPY TREATMENT NOTE
Outpatient Physical Therapy Ortho Treatment Note  [facility]     Patient Name: Roberta Hurley  : 1975  MRN: [id number]  Today's Date: 2023      Visit Date: 2023     Pt seen for 15 PT sessions  Reported Improvement:  60-65 %  MD Visit: 2023  Recheck Date: 2023     Therapy Diagnosis:  Complex L medial meniscus repair (DOS: 3/22/2023)    Visit Dx:    ICD-10-CM ICD-9-CM   1. Complex tear of medial meniscus of knee as current injury, initial encounter  S83.239A 836.0       There is no problem list on file for this patient.       Past Medical History:   Diagnosis Date   • Asthma    • Cancer    • Leukemia         Past Surgical History:   Procedure Laterality Date   • HYSTERECTOMY     • MENISCECTOMY Left         PT Ortho     Row Name 23 1000       Subjective Comments    Subjective Comments denies pain currently. states that she feels it when she pushes the knee all the way straight  -       Precautions and Contraindications    Precautions/Limitations no known precautions/limitations  -    Contraindications HX of CA; No ESTIM/US; Currently has Leukemia  -       Subjective Pain    Able to rate subjective pain? yes  -    Pre-Treatment Pain Level 0  -MH       Left Lower Ext    Lt Knee Extension/Flexion AROM 0-122°  -          User Key  (r) = Recorded By, (t) = Taken By, (c) = Cosigned By    Initials Name Provider Type     Thelma Mccrary PTA Physical Therapist Assistant                             PT Assessment/Plan     Row Name 23 1000          PT Assessment    Assessment Comments patient has improved AROM L knee at this time. she is able to get full knee extension today. patient needs consistent cueing and reminders for terminal knee extension during gait.  -        PT Plan    PT Frequency 2x/week  -     PT Plan Comments continue to work on kinematics of gait with terminal knee extension  -           User Key  (r) = Recorded By, (t) = Taken By,  (c) = Cosigned By    Initials Name Provider Type     Gray Mccraryfranco OLIVAS PTA Physical Therapist Assistant                 Modalities     Row Name 05/05/23 1000             Subjective Pain    Post-Treatment Pain Level 0  -            User Key  (r) = Recorded By, (t) = Taken By, (c) = Cosigned By    Initials Name Provider Type     Thelma MccraryASPEN Physical Therapist Assistant               OP Exercises     Row Name 05/05/23 1000             Subjective Comments    Subjective Comments denies pain currently. states that she feels it when she pushes the knee all the way straight  -         Subjective Pain    Able to rate subjective pain? yes  -      Pre-Treatment Pain Level 0  -      Post-Treatment Pain Level 0  -         Exercise 1    Exercise Name 1 Pro II LE for strength, endurance, ROM  -      Time 1 10 minutes  -      Additional Comments L 7.0  -         Exercise 2    Exercise Name 2 L St. hS S with IR/ER  -      Reps 2 2 each position  -      Time 2 30 sec hold  -         Exercise 3    Exercise Name 3 Retro Step Up/Fwd Step Down  -      Reps 3 20  -      Additional Comments 6 inch step  -         Exercise 4    Exercise Name 4 Ecc Step Down  -      Reps 4 20  -MH      Additional Comments 6 inch step  -         Exercise 5    Exercise Name 5 CC 4 way Walkouts  -      Reps 5 10 each direction  -      Additional Comments 5 plates  -         Exercise 6    Exercise Name 6 Prone Hang  -      Time 6 5 minutes  -      Additional Comments 5#  -         Exercise 7    Exercise Name 7 Wall Sits  -      Reps 7 3  -      Time 7 30 sec hold  -         Exercise 8    Exercise Name 8 Shuttle 2L  -      Time 8 5 minues  -      Additional Comments 8 cords  -         Exercise 9    Exercise Name 9 Shuttle 1L  -      Time 9 3 minutes  -      Additional Comments 6 cords  -            User Key  (r) = Recorded By, (t) = Taken By, (c) = Cosigned By    Initials Name Provider Type      Thelma Mccrary PTA Physical Therapist Assistant                              PT OP Goals     Row Name 05/05/23 1000          PT Short Term Goals    STG Date to Achieve 04/19/23  -     STG 1 Patient to be I with HEP with additions/changes prior to next recertification.  -     STG 1 Progress Met  -     STG 2 Patient to be compliant with limiting L knee flexion to 90 degrees for 6 weeks post-op.  -     STG 2 Progress Met  -     STG 3 L knee flexion AROM >/= 90 degrees at 6 weeks post-op.  -     STG 3 Progress Met  -     STG 4 L knee extension AROM </= 3°.  -     STG 4 Progress Met  -     STG 5 Patient to perform 10 Sit to/from stand with equal weight bearing without cues from PT.  -     STG 5 Progress Met  -     STG 6 Patient to perform L SLR x 20 reps with no knee lag present without cues from PT.  -     STG 6 Progress Met  -        Long Term Goals    LTG 1 I with final HEP.  -     LTG 2 Patient to ambulate with no AD >/= 1/4 mile with non-antalgic, proper heel strike with no difficulty.  -     LTG 2 Progress Progressing;Ongoing  -     LTG 3 L knee flexion AROM >/= 120°.  -     LTG 3 Progress Ongoing;Progressing  -     LTG 4 Patient to perform 30 second hold wall sit without pain or difficulty.  -     LTG 4 Progress Ongoing;Progressing  -     LTG 5 Patient to ascend/descend 3 steps with no HR assist or AD without no difficulty.  -     LTG 5 Progress Partially Met;Ongoing;Progressing  -     LTG 6 L LE strength 5/5.  -     LTG 6 Progress Ongoing;Progressing  -        Time Calculation    PT Goal Re-Cert Due Date 05/17/23  -           User Key  (r) = Recorded By, (t) = Taken By, (c) = Cosigned By    Initials Name Provider Type    Thelma Wesley PTA Physical Therapist Assistant                Therapy Education  Given: HEP, Symptoms/condition management  Program: Reinforced, Progressed  How Provided: Verbal  Provided to: Patient  Level of Understanding: Teach back  education performed, Verbalized, Demonstrated              Time Calculation:   Start Time: 1047  Stop Time: 1152  Time Calculation (min): 65 min  Total Timed Code Minutes- PT: 65 minute(s)  Therapy Charges for Today     Code Description Service Date Service Provider Modifiers Qty    57899470698 HC PT THER SUPP EA 15 MIN 5/5/2023 Thelma Mccrary PTA GP, CQ 1    58354726170 HC PT THER PROC EA 15 MIN 5/5/2023 Thelma Mccrary PTA GP, CQ 4                    Thelma Mccrary PTA  5/5/2023       This document has been electronically signed by Thelma Mccrary PTA on May 5, 2023 11:56 CDT

## 2023-05-09 ENCOUNTER — APPOINTMENT (OUTPATIENT)
Dept: PHYSICAL THERAPY | Facility: HOSPITAL | Age: 48
End: 2023-05-09
Payer: COMMERCIAL

## 2023-05-10 ENCOUNTER — HOSPITAL ENCOUNTER (OUTPATIENT)
Dept: PHYSICAL THERAPY | Facility: HOSPITAL | Age: 48
Setting detail: THERAPIES SERIES
Discharge: HOME | End: 2023-05-10
Payer: COMMERCIAL

## 2023-05-10 DIAGNOSIS — S83.239A COMPLEX TEAR OF MEDIAL MENISCUS OF KNEE AS CURRENT INJURY, INITIAL ENCOUNTER: Primary | ICD-10-CM

## 2023-05-10 PROCEDURE — 97110 THERAPEUTIC EXERCISES: CPT

## 2023-05-10 NOTE — THERAPY TREATMENT NOTE
Outpatient Physical Therapy Ortho Treatment Note  [facility]     Patient Name: Roberta Hurley  : 1975  MRN: [id number]  Today's Date: 5/10/2023    Pt seen for 16 PT sessions  Reported Improvement:  60-65 %  MD Visit: 2023  Recheck Date: 2023     Therapy Diagnosis:  Complex L medial meniscus repair (DOS: 3/22/2023)    Visit Date: 05/10/2023    Visit Dx:    ICD-10-CM ICD-9-CM   1. Complex tear of medial meniscus of knee as current injury, initial encounter  S83.239A 836.0       There is no problem list on file for this patient.       Past Medical History:   Diagnosis Date   • Asthma    • Cancer    • Leukemia         Past Surgical History:   Procedure Laterality Date   • HYSTERECTOMY     • MENISCECTOMY Left         PT Ortho     Row Name 05/10/23 0900       Subjective Comments    Subjective Comments Patient reports her knee is bothering her a little bit today. Reports no other new complaints.  -AC       Precautions and Contraindications    Precautions/Limitations no known precautions/limitations  -AC    Contraindications HX of CA; No ESTIM/US; Currently has Leukemia  -AC       Subjective Pain    Able to rate subjective pain? yes  -AC    Pre-Treatment Pain Level 3  -AC          User Key  (r) = Recorded By, (t) = Taken By, (c) = Cosigned By    Initials Name Provider Type    Gianna Azul, PT Physical Therapist                             PT Assessment/Plan     Row Name 05/10/23 0900          PT Assessment    Assessment Comments Patient still challenged with prone hang activity. Able to complete all new therEx with no difficulty. Patient still lacks terminal knee extension with gait activities.  -AC        PT Plan    PT Frequency 2x/week  -AC     PT Plan Comments Measure L knee AROM. Revisit shuttle. Attempt mini squats. Attempt knee mobs and patella mobs to help improve L knee extension.  -AC           User Key  (r) = Recorded By, (t) = Taken By, (c) = Cosigned By    Initials  "Name Provider Type    AC Gianna Francisco, PT Physical Therapist                   OP Exercises     Row Name 05/10/23 0900             Subjective Comments    Subjective Comments Patient reports her knee is bothering her a little bit today. Reports no other new complaints.  -AC         Subjective Pain    Able to rate subjective pain? yes  -AC      Pre-Treatment Pain Level 3  -AC      Post-Treatment Pain Level 5  -AC         Exercise 1    Exercise Name 1 Pro II LE for strength, endurance, ROM  -AC      Time 1 10 minutes  -AC      Additional Comments L 7.0  -AC         Exercise 2    Exercise Name 2 L St. hS S with IR/ER  -AC      Reps 2 2 each position  -AC      Time 2 30 sec hold  -AC         Exercise 3    Exercise Name 3 Retro Step Up/Fwd Step Down  -AC      Reps 3 20  -AC      Additional Comments 6 inch step  -AC         Exercise 4    Exercise Name 4 Ecc Step Down  -AC      Reps 4 20  -AC      Additional Comments 6 inch step  -AC         Exercise 5    Exercise Name 5 CC 4 way Walkouts  -AC      Reps 5 10 each direction  -AC      Additional Comments 5 plates  -AC         Exercise 6    Exercise Name 6 CC TKEs  -AC      Sets 6 1  -AC      Reps 6 20  -AC      Time 6 5\" hold  -AC      Additional Comments 4 plates  -AC         Exercise 7    Exercise Name 7 Wall Sits  -AC      Reps 7 3  -AC      Time 7 30 sec hold  -AC         Exercise 8    Exercise Name 8 Patient education  -AC      Time 8 --  -AC      Additional Comments see flowsheet  -AC         Exercise 9    Exercise Name 9 St. gastroc Stretch  -AC      Reps 9 2  -AC      Time 9 30 sec hold  -AC      Additional Comments --  -AC         Exercise 10    Exercise Name 10 Prone TKE  -AC      Sets 10 1  -AC      Reps 10 15  -AC      Time 10 3\" hold  -AC         Exercise 11    Exercise Name 11 Prone hang  -AC      Time 11 5 minutes  -AC      Additional Comments 5#  -AC         Exercise 12    Exercise Name 12 --  -AC      Reps 12 --  -AC      Time 12 --  -AC            User " Key  (r) = Recorded By, (t) = Taken By, (c) = Cosigned By    Initials Name Provider Type    Gianna Azul PT Physical Therapist                              PT OP Goals     Row Name 05/10/23 0900          PT Short Term Goals    STG Date to Achieve 04/19/23  -     STG 1 Patient to be I with HEP with additions/changes prior to next recertification.  -AC     STG 1 Progress Met  -AC     STG 2 Patient to be compliant with limiting L knee flexion to 90 degrees for 6 weeks post-op.  -AC     STG 2 Progress Met  -AC     STG 3 L knee flexion AROM >/= 90 degrees at 6 weeks post-op.  -AC     STG 3 Progress Met  -AC     STG 4 L knee extension AROM </= 3°.  -AC     STG 4 Progress Met  -AC     STG 5 Patient to perform 10 Sit to/from stand with equal weight bearing without cues from PT.  -     STG 5 Progress Met  -     STG 6 Patient to perform L SLR x 20 reps with no knee lag present without cues from PT.  -     STG 6 Progress Met  -        Long Term Goals    LTG 1 I with final HEP.  -     LTG 2 Patient to ambulate with no AD >/= 1/4 mile with non-antalgic, proper heel strike with no difficulty.  -AC     LTG 2 Progress Progressing;Ongoing  -AC     LTG 3 L knee flexion AROM >/= 120°.  -AC     LTG 3 Progress Ongoing;Progressing  -     LTG 4 Patient to perform 30 second hold wall sit without pain or difficulty.  -AC     LTG 4 Progress Ongoing;Progressing  -AC     LTG 5 Patient to ascend/descend 3 steps with no HR assist or AD without no difficulty.  -     LTG 5 Progress Partially Met;Ongoing;Progressing  -     LTG 6 L LE strength 5/5.  -     LTG 6 Progress Ongoing;Progressing  -        Time Calculation    PT Goal Re-Cert Due Date 05/17/23  -           User Key  (r) = Recorded By, (t) = Taken By, (c) = Cosigned By    Initials Name Provider Type    Gianna Azul PT Physical Therapist                               Time Calculation:   Start Time: 0917  Stop Time: 1013  Time Calculation (min): 56  min  Total Timed Code Minutes- PT: 56 minute(s)  Therapy Charges for Today     Code Description Service Date Service Provider Modifiers Qty    08971056529 HC PT THER SUPP EA 15 MIN 5/10/2023 Gianna Francisco, PT GP 1    73321797174 HC PT THER PROC EA 15 MIN 5/10/2023 FranciscoGianna, PT GP 4                    Autumn Francisco, PT , DPT 5/10/2023

## 2023-05-12 ENCOUNTER — HOSPITAL ENCOUNTER (OUTPATIENT)
Dept: PHYSICAL THERAPY | Facility: HOSPITAL | Age: 48
Setting detail: THERAPIES SERIES
Discharge: HOME | End: 2023-05-12
Payer: COMMERCIAL

## 2023-05-12 DIAGNOSIS — S83.239A COMPLEX TEAR OF MEDIAL MENISCUS OF KNEE AS CURRENT INJURY, INITIAL ENCOUNTER: Primary | ICD-10-CM

## 2023-05-12 PROCEDURE — 97110 THERAPEUTIC EXERCISES: CPT

## 2023-05-12 NOTE — THERAPY TREATMENT NOTE
Outpatient Physical Therapy Ortho Treatment Note  [facility]     Patient Name: Roberta Hurley  : 1975  MRN: [id number]  Today's Date: 2023     Pt seen for 17 PT sessions  Reported Improvement:  60-65 %  MD Visit: 2023  Recheck Date: 2023    Therapy Diagnosis:  Complex L medial meniscus repair (DOS: 3/22/2023)        Visit Date: 2023    Visit Dx:    ICD-10-CM ICD-9-CM   1. Complex tear of medial meniscus of knee as current injury, initial encounter  S83.239A 836.0       There is no problem list on file for this patient.       Past Medical History:   Diagnosis Date   • Asthma    • Cancer    • Leukemia         Past Surgical History:   Procedure Laterality Date   • HYSTERECTOMY     • MENISCECTOMY Left         PT Ortho     Row Name 23 1200       Subjective Comments    Subjective Comments No pain related to L knee.  Been doing my exercises every day  -       Precautions and Contraindications    Precautions/Limitations no known precautions/limitations  -    Contraindications HX of CA; No ESTIM/US; Currently has Leukemia  -       Subjective Pain    Able to rate subjective pain? yes  -    Pre-Treatment Pain Level 0  -JW       Left Lower Ext    Lt Knee Extension/Flexion AROM 0-121°  -          User Key  (r) = Recorded By, (t) = Taken By, (c) = Cosigned By    Initials Name Provider Type    Nya Riggins, PTA Physical Therapist Assistant                             PT Assessment/Plan     Row Name 23 1200          PT Assessment    Assessment Comments Pt reports she has noticed less active motion when doing SAQ's at home.  Revisited form and functionality of therex. Good effort with 4 way SLR with good technique and no increased pain.  -        PT Plan    PT Frequency 2x/week  -     PT Plan Comments Next visit add wall sit and push/pull sled  -           User Key  (r) = Recorded By, (t) = Taken By, (c) = Cosigned By    Initials Name Provider Type     Nya Riggins PTA Physical Therapist Assistant                   OP Exercises     Row Name 05/12/23 1200             Subjective Comments    Subjective Comments No pain related to L knee.  Been doing my exercises every day  -JW         Subjective Pain    Able to rate subjective pain? yes  -JW      Pre-Treatment Pain Level 0  -JW      Post-Treatment Pain Level 0  -JW         Exercise 1    Exercise Name 1 Pro II LE for strength, endurance, ROM  -JW      Time 1 10 minutes  -JW      Additional Comments L 7.0  -JW         Exercise 2    Exercise Name 2 L St. hS S with IR/ER  -JW      Reps 2 2  -JW      Time 2 30  -JW         Exercise 3    Exercise Name 3 B incline gastroc st  -JW      Reps 3 2  -JW      Time 3 30  -JW         Exercise 4    Exercise Name 4 SLR  -JW      Reps 4 20  -JW      Time 4 5 sec hold  -JW         Exercise 5    Exercise Name 5 SL SLR - Abduction  -JW      Reps 5 15  -JW         Exercise 6    Exercise Name 6 Prone SLR - Extension  -JW      Reps 6 15  -JW         Exercise 7    Exercise Name 7 SAQ review  -JW         Exercise 8    Exercise Name 8 Heel Slides  -JW      Time 8 3 min wiht 5 sec hold at end range  -JW      Additional Comments strap assist  -JW         Exercise 9    Exercise Name 9 Measurement  -JW            User Key  (r) = Recorded By, (t) = Taken By, (c) = Cosigned By    Initials Name Provider Type    Nya Riggins PTA Physical Therapist Assistant                              PT OP Goals     Row Name 05/12/23 1200          PT Short Term Goals    STG Date to Achieve 04/19/23  -     STG 1 Patient to be I with HEP with additions/changes prior to next recertification.  -     STG 1 Progress Met  -     STG 2 Patient to be compliant with limiting L knee flexion to 90 degrees for 6 weeks post-op.  -     STG 2 Progress Met  -     STG 3 L knee flexion AROM >/= 90 degrees at 6 weeks post-op.  -     STG 3 Progress Met  -     STG 4 L knee extension AROM </= 3°.  -     STG 4  Progress Met  -     STG 5 Patient to perform 10 Sit to/from stand with equal weight bearing without cues from PT.  -     STG 5 Progress Met  -     STG 6 Patient to perform L SLR x 20 reps with no knee lag present without cues from PT.  -     STG 6 Progress Met  -        Long Term Goals    LTG 1 I with final HEP.  -     LTG 2 Patient to ambulate with no AD >/= 1/4 mile with non-antalgic, proper heel strike with no difficulty.  -     LTG 2 Progress Progressing;Ongoing  -     LTG 3 L knee flexion AROM >/= 120°.  -     LTG 3 Progress Met;Ongoing  -     LTG 4 Patient to perform 30 second hold wall sit without pain or difficulty.  -     LTG 4 Progress Ongoing;Progressing  -     LTG 5 Patient to ascend/descend 3 steps with no HR assist or AD without no difficulty.  -     LTG 5 Progress Partially Met;Ongoing;Progressing  -     LTG 6 L LE strength 5/5.  -     LTG 6 Progress Ongoing;Progressing  -        Time Calculation    PT Goal Re-Cert Due Date 05/17/23  -           User Key  (r) = Recorded By, (t) = Taken By, (c) = Cosigned By    Initials Name Provider Type    Nya Riggins PTA Physical Therapist Assistant                               Time Calculation:   Start Time: 1215  Stop Time: 1300  Time Calculation (min): 45 min  Therapy Charges for Today     Code Description Service Date Service Provider Modifiers Qty    56879991669 HC PT THER PROC EA 15 MIN 5/12/2023 Nya Soriano PTA GP, CQ 3    81592210907 HC PT THER SUPP EA 15 MIN 5/12/2023 Nya Soriano PTA GP, CQ 1                    Nya Soriano PTA  5/12/2023

## 2023-05-16 ENCOUNTER — APPOINTMENT (OUTPATIENT)
Dept: PHYSICAL THERAPY | Facility: HOSPITAL | Age: 48
End: 2023-05-16
Payer: COMMERCIAL

## 2023-05-19 ENCOUNTER — HOSPITAL ENCOUNTER (OUTPATIENT)
Dept: PHYSICAL THERAPY | Facility: HOSPITAL | Age: 48
Setting detail: THERAPIES SERIES
Discharge: HOME | End: 2023-05-19
Payer: COMMERCIAL

## 2023-05-19 DIAGNOSIS — S83.239A COMPLEX TEAR OF MEDIAL MENISCUS OF KNEE AS CURRENT INJURY, INITIAL ENCOUNTER: Primary | ICD-10-CM

## 2023-05-19 PROCEDURE — 97110 THERAPEUTIC EXERCISES: CPT | Performed by: PHYSICAL THERAPIST

## 2023-05-19 NOTE — THERAPY TREATMENT NOTE
Outpatient Physical Therapy Ortho Treatment Note  [facility]     Patient Name: Roberta Hurley  : 1975  MRN: [id number]  Today's Date: 2023      Visit Date: 2023     Pt seen for 18 PT sessions  Reported Improvement:  60-65 %  MD Visit: 2023  Recheck Date: 2023     Therapy Diagnosis:  Complex L medial meniscus repair (DOS: 3/22/2023)    Visit Dx:    ICD-10-CM ICD-9-CM   1. Complex tear of medial meniscus of knee as current injury, initial encounter  S83.239A 836.0       There is no problem list on file for this patient.       Past Medical History:   Diagnosis Date   • Asthma    • Cancer    • Leukemia         Past Surgical History:   Procedure Laterality Date   • HYSTERECTOMY     • MENISCECTOMY Left         PT Ortho     Row Name 23 1200       Precautions and Contraindications    Precautions/Limitations no known precautions/limitations  -AJ    Contraindications HX of CA; No ESTIM/US; Currently has Leukemia  -          User Key  (r) = Recorded By, (t) = Taken By, (c) = Cosigned By    Initials Name Provider Type    Danyell Dixon, PT DPT Physical Therapist                             PT Assessment/Plan     Row Name 23 1200          PT Assessment    Assessment Comments Patient hesitant with shuttle jumps but able to complete without issues. Struggle with endurance with push/pull sled, but able to complete.  -        PT Plan    PT Frequency 2x/week  -     PT Plan Comments Continue to progress higher level activities. Try BOSU step up/over.  -           User Key  (r) = Recorded By, (t) = Taken By, (c) = Cosigned By    Initials Name Provider Type    Danyell Dixon, PT DPT Physical Therapist                   OP Exercises     Row Name 23 1200             Subjective Comments    Subjective Comments Patient reports that her knee is a little more swollen. She reports she has been working a lot.  -JESSICA         Subjective Pain    Able to  "rate subjective pain? yes  -AJ      Pre-Treatment Pain Level 3  -      Post-Treatment Pain Level 0  -         Exercise 1    Exercise Name 1 Pro II LE for strength, endurance, ROM  -      Time 1 10 min  -AJ      Additional Comments L 7.0  -AJ         Exercise 2    Exercise Name 2 L St. HS S with IR/ER  -AJ      Reps 2 2  -AJ      Time 2 30 seconds each  -AJ         Exercise 3    Exercise Name 3 B incline gastroc st  -AJ      Reps 3 2  -AJ      Time 3 30  -AJ         Exercise 4    Exercise Name 4 Wall sits  -      Sets 4 1  -AJ      Reps 4 10  -AJ      Time 4 15\" holds  -         Exercise 5    Exercise Name 5 Push pull sled F/R  -      Sets 5 1  -AJ      Reps 5 5 laps of ~30 feet  -AJ      Additional Comments 40#  -         Exercise 6    Exercise Name 6 CC 4 way Walkouts  -      Reps 6 5 each direction  -AJ      Additional Comments 5 plates  -         Exercise 7    Exercise Name 7 Shuttle: 2L press  -      Sets 7 2  -AJ      Reps 7 20  -AJ      Additional Comments 2 cords  -AJ            User Key  (r) = Recorded By, (t) = Taken By, (c) = Cosigned By    Initials Name Provider Type    Danyell Dixon, PT DPT Physical Therapist               All therapeutic interventions performed today were to address current functional limitations and/or deficits in addressing all physical therapy goals.                  PT OP Goals     Row Name 05/19/23 1200          PT Short Term Goals    STG Date to Achieve 04/19/23  -     STG 1 Patient to be I with HEP with additions/changes prior to next recertification.  -     STG 1 Progress Met  -     STG 2 Patient to be compliant with limiting L knee flexion to 90 degrees for 6 weeks post-op.  -     STG 2 Progress Met  -     STG 3 L knee flexion AROM >/= 90 degrees at 6 weeks post-op.  -     STG 3 Progress Met  -     STG 4 L knee extension AROM </= 3°.  -     STG 4 Progress Met  -     STG 5 Patient to perform 10 Sit to/from stand with equal weight " bearing without cues from PT.  -     STG 5 Progress Met  -     STG 6 Patient to perform L SLR x 20 reps with no knee lag present without cues from PT.  -     STG 6 Progress Met  -        Long Term Goals    LTG 1 I with final HEP.  -     LTG 2 Patient to ambulate with no AD >/= 1/4 mile with non-antalgic, proper heel strike with no difficulty.  -     LTG 2 Progress Progressing;Ongoing  -     LTG 3 L knee flexion AROM >/= 120°.  -     LTG 3 Progress Met;Ongoing  -     LTG 4 Patient to perform 30 second hold wall sit without pain or difficulty.  -     LTG 4 Progress Ongoing;Progressing  -     LTG 5 Patient to ascend/descend 3 steps with no HR assist or AD without no difficulty.  -     LTG 5 Progress Partially Met;Ongoing;Progressing  -     LTG 6 L LE strength 5/5.  -     LTG 6 Progress Ongoing;Progressing  -        Time Calculation    PT Goal Re-Cert Due Date 05/17/23  -           User Key  (r) = Recorded By, (t) = Taken By, (c) = Cosigned By    Initials Name Provider Type     Danyell Merida, PT DPT Physical Therapist                               Time Calculation:   Start Time: 1221 (Patient arrived late.)  Stop Time: 1300  Time Calculation (min): 39 min  Total Timed Code Minutes- PT: 39 minute(s)  Therapy Charges for Today     Code Description Service Date Service Provider Modifiers Qty    08942734430 HC PT THER SUPP EA 15 MIN 5/19/2023 Danyell Merida, PT DPT GP 1    94976072565 HC PT THER PROC EA 15 MIN 5/19/2023 Danyell Merida, PT DPT GP 3                  This document has been electronically signed by Danyell Merida PT DPT, [facility] on May 19, 2023 13:09 CDT

## 2023-05-24 ENCOUNTER — HOSPITAL ENCOUNTER (OUTPATIENT)
Dept: PHYSICAL THERAPY | Facility: HOSPITAL | Age: 48
Setting detail: THERAPIES SERIES
Discharge: HOME | End: 2023-05-24
Payer: COMMERCIAL

## 2023-05-24 DIAGNOSIS — S83.239A COMPLEX TEAR OF MEDIAL MENISCUS OF KNEE AS CURRENT INJURY, INITIAL ENCOUNTER: Primary | ICD-10-CM

## 2023-05-24 PROCEDURE — 97110 THERAPEUTIC EXERCISES: CPT | Performed by: PHYSICAL THERAPIST

## 2023-05-24 PROCEDURE — 97530 THERAPEUTIC ACTIVITIES: CPT | Performed by: PHYSICAL THERAPIST

## 2023-05-24 NOTE — THERAPY TREATMENT NOTE
Outpatient Physical Therapy Ortho Treatment Note  [facility]     Patient Name: Roberta Hurley  : 1975  MRN: [id number]  Today's Date: 2023      Visit Date: 2023     Pt seen for 19 PT sessions  Reported Improvement:  60-65 %  MD Visit: 2023  Recheck Date: 2023     Therapy Diagnosis:  Complex L medial meniscus repair (DOS: 3/22/2023)    Visit Dx:    ICD-10-CM ICD-9-CM   1. Complex tear of medial meniscus of knee as current injury, initial encounter  S83.239A 836.0       There is no problem list on file for this patient.       Past Medical History:   Diagnosis Date   • Asthma    • Cancer    • Leukemia         Past Surgical History:   Procedure Laterality Date   • HYSTERECTOMY     • MENISCECTOMY Left         PT Ortho     Row Name 23 0900       Precautions and Contraindications    Precautions/Limitations no known precautions/limitations  -JESSICA    Contraindications HX of CA; No ESTIM/US; Currently has Leukemia  -JESSICA       Subjective Pain    Able to rate subjective pain? yes  -JESSICA          User Key  (r) = Recorded By, (t) = Taken By, (c) = Cosigned By    Initials Name Provider Type    Danyell Dixon, PT DPT Physical Therapist                             PT Assessment/Plan     Row Name 23 0900          PT Assessment    Assessment Comments Patient still tends to ambulate with knee slightly flexed and foot flat versus heel strike. 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 Doiscussed doing HS S when exiting the truck each time to help. Improved gait with concentration. Tolerated new exercises well.  -JESSICA        PT Plan    PT Frequency 2x/week  -JESSICA     PT Plan Comments Recheck next visit, anticipate D/C soon.  -JESSICA           User Key  (r) = Recorded By, (t) = Taken By, (c) = Cosigned By    Initials Name Provider Type    Danyell Dixon, PT DPT Physical Therapist                   OP Exercises      Row Name 05/24/23 0900             Subjective Comments    Subjective Comments Patient reports not too bad, but was yesterday when she ran out of Ibuprofen  -AJ         Subjective Pain    Able to rate subjective pain? yes  -AJ      Pre-Treatment Pain Level 0  -AJ         Exercise 1    Exercise Name 1 Pro II LE for strength, endurance, ROM  -AJ      Time 1 10 min  -AJ      Additional Comments L 7.5  -AJ         Exercise 2    Exercise Name 2 L St. HS S with IR/ER  -AJ      Reps 2 2  -AJ      Time 2 30 seconds  -AJ         Exercise 3    Exercise Name 3 B incline gastroc st  -AJ      Reps 3 2  -AJ      Time 3 30 seconds  -AJ         Exercise 4    Exercise Name 4 CC 4 way Walkouts  -AJ      Reps 4 5 each direction  -AJ      Additional Comments 5 plates  -AJ         Exercise 5    Exercise Name 5 Push pull sled F/R  -AJ      Sets 5 1  -AJ      Reps 5 5 laps of ~30 feet  -AJ      Additional Comments 40#  -AJ         Exercise 6    Exercise Name 6 BOSU step up and over  -AJ      Sets 6 1  -AJ      Reps 6 20  -AJ         Exercise 7    Exercise Name 7 BOSU lateral step up  -AJ      Sets 7 1  -AJ      Reps 7 20  -AJ         Exercise 8    Exercise Name 8 Airex Clemons toss  -AJ      Sets 8 2  -AJ      Reps 8 20  -AJ      Additional Comments 4# medicine ball  -AJ         Exercise 9    Exercise Name 9 track walk: fwd  -AJ      Reps 9 2 laps  -AJ      Additional Comments concentrating on getting TKE and good heel strike  -AJ         Exercise 10    Exercise Name 10 Discussin of HS S each time she gets out of the truck on the L  -AJ            User Key  (r) = Recorded By, (t) = Taken By, (c) = Cosigned By    Initials Name Provider Type    Danyell Dixon, PT DPT Physical Therapist               All therapeutic interventions performed today were to address current functional limitations and/or deficits in addressing all physical therapy goals.                  PT OP Goals     Row Name 05/24/23 0900          PT Short Term  Goals    STG Date to Achieve 04/19/23  -     STG 1 Patient to be I with HEP with additions/changes prior to next recertification.  -     STG 1 Progress Met  -     STG 2 Patient to be compliant with limiting L knee flexion to 90 degrees for 6 weeks post-op.  -     STG 2 Progress Met  -     STG 3 L knee flexion AROM >/= 90 degrees at 6 weeks post-op.  -     STG 3 Progress Met  -     STG 4 L knee extension AROM </= 3°.  -     STG 4 Progress Met  -     STG 5 Patient to perform 10 Sit to/from stand with equal weight bearing without cues from PT.  -     STG 5 Progress Met  -     STG 6 Patient to perform L SLR x 20 reps with no knee lag present without cues from PT.  -     STG 6 Progress Met  -        Long Term Goals    LTG 1 I with final HEP.  -     LTG 2 Patient to ambulate with no AD >/= 1/4 mile with non-antalgic, proper heel strike with no difficulty.  -     LTG 2 Progress Progressing;Ongoing  -     LTG 3 L knee flexion AROM >/= 120°.  -     LTG 3 Progress Met;Ongoing  -     LTG 4 Patient to perform 30 second hold wall sit without pain or difficulty.  -     LTG 4 Progress Ongoing;Progressing  -     LTG 5 Patient to ascend/descend 3 steps with no HR assist or AD without no difficulty.  -     LTG 5 Progress Partially Met;Ongoing;Progressing  -     LTG 6 L LE strength 5/5.  -     LTG 6 Progress Ongoing;Progressing  -        Time Calculation    PT Goal Re-Cert Due Date 05/17/23  -           User Key  (r) = Recorded By, (t) = Taken By, (c) = Cosigned By    Initials Name Provider Type     Danyell Merida, PT DPT Physical Therapist                               Time Calculation:   Start Time: 0915  Stop Time: 0959  Time Calculation (min): 44 min  Total Timed Code Minutes- PT: 44 minute(s)  Therapy Charges for Today     Code Description Service Date Service Provider Modifiers Qty    90685398395 HC PT THER SUPP EA 15 MIN 5/24/2023 Danyell Merida, PT DPT GP 1     [id number]  PT THER PROC EA 15 MIN 5/24/2023 Danyell Merida, PT DPT GP 2    [id number]  PT THERAPEUTIC ACT EA 15 MIN 5/24/2023 Danyell Merida, PT DPT GP 1                This document has been electronically signed by Danyell Merida PT DPT, [facility] on May 24, 2023 10:11 CDT

## 2023-05-26 ENCOUNTER — HOSPITAL ENCOUNTER (OUTPATIENT)
Dept: PHYSICAL THERAPY | Facility: HOSPITAL | Age: 48
Setting detail: THERAPIES SERIES
Discharge: HOME | End: 2023-05-26
Payer: COMMERCIAL

## 2023-05-26 DIAGNOSIS — S83.239A COMPLEX TEAR OF MEDIAL MENISCUS OF KNEE AS CURRENT INJURY, INITIAL ENCOUNTER: Primary | ICD-10-CM

## 2023-05-26 PROCEDURE — 97110 THERAPEUTIC EXERCISES: CPT

## 2023-05-26 PROCEDURE — 97530 THERAPEUTIC ACTIVITIES: CPT

## 2023-05-26 NOTE — THERAPY TREATMENT NOTE
Outpatient Physical Therapy Ortho Progress Note  [facility]     Patient Name: Roberta Hurley  : 1975  MRN: [id number]  Today's Date: 2023    Pt seen for 20 PT sessions  Reported Improvement:  80%  MD Visit: 2023  Recheck Date: 2023     Therapy Diagnosis:  Complex L medial meniscus repair (DOS: 3/22/2023)     Visit Date: 2023    Visit Dx:    ICD-10-CM ICD-9-CM   1. Complex tear of medial meniscus of knee as current injury, initial encounter  S83.239A 836.0       There is no problem list on file for this patient.       Past Medical History:   Diagnosis Date   • Asthma    • Cancer    • Leukemia         Past Surgical History:   Procedure Laterality Date   • HYSTERECTOMY     • MENISCECTOMY Left         PT Ortho     Row Name 23 0900       Posture/Observations    Forward Head Bilateral:;Mild  -AC    Scapular winging Bilateral:;Normal  -AC    Scoliosis Bilateral:;Normal  -AC    Lumbar lordosis Bilateral:;Normal  -AC    Iliac crests Bilateral:;Normal  -AC    Genu valgus Bilateral:;Normal  -AC    Genu varus Bilateral:;Normal  -AC    Posture/Observations Comments No distress, no assistive device, wearing B tennis shoes.  -AC       Left Lower Ext    Lt Knee Extension/Flexion AROM 0-120°  -AC       MMT (Manual Muscle Testing)    General MMT Comments B LE 5/5, except L QS 4+/5, L HS 4+/5  -AC       Lower Extremity Flexibility    Hamstrings Left:;Mildly limited  -AC       Transfers    Comment, (Transfers) I with all transfers.  -AC       Gait/Stairs (Locomotion)    [place]bank Level (Gait) independent  -AC    Pattern (Gait) step-through  -AC    Deviations/Abnormal Patterns (Gait) left sided deviations;base of support, wide;gait speed decreased;antalgic  -AC    Left Sided Gait Deviations decreased knee extension;heel strike decreased  -AC    [place]bank Level (Stairs) independent  -AC    Handrail Location (Stairs) none  -AC    Number of Steps (Stairs) 3  -AC    Ascending  Technique (Stairs) step-over-step  -AC    Descending Technique (Stairs) step-over-step  -AC    Stairs, Impairments ROM decreased;strength decreased  -    Comment, (Gait/Stairs) Patient still demo difficulty with eccentric control on descent of stairs.  -          User Key  (r) = Recorded By, (t) = Taken By, (c) = Cosigned By    Initials Name Provider Type     Gianna Francisco PT Physical Therapist                             PT Assessment/Plan     Row Name 05/26/23 0900          PT Assessment    Functional Limitations Impaired gait;Performance in work activities;Limitation in home management;Limitations in community activities  -     Impairments Balance;Gait;Coordination;Range of motion;Muscle strength;Impaired muscle endurance;Impaired flexibility  -AC     Assessment Comments Patient biggest deficits at this time are gait mechanics and eccentric control of L quadricep. Patient has met almost all of her goals at this time. L knee AROM 0-120°. Patient would benefit from 3-4 more skilled PT sessions to meet remaining goals and improve overall strength and gait mechanics to allow patient to perform work activities without pain.  -     Please refer to paper survey for additional self-reported information No  -AC     Rehab Potential Good  -AC     Patient/caregiver participated in establishment of treatment plan and goals Yes  -AC     Patient would benefit from skilled therapy intervention Yes  -AC        PT Plan    PT Frequency 2x/week  -AC     Predicted Duration of Therapy Intervention (PT) 3-4 more visits  -AC     PT Plan Comments Continue to progress L knee AROM. L LE strength, gait mechanics, and functional activities. Continue to work on eccentric control of L quadriceps and full TKE. May attempt manual therapy to L HS and joint mobilizations to help pain with TKE.  -           User Key  (r) = Recorded By, (t) = Taken By, (c) = Cosigned By    Initials Name Provider Type    AC Gianna Francisco PT  Physical Therapist                   OP Exercises     Row Name 05/26/23 0900             Subjective Comments    Subjective Comments Knee only hurts when she straightens her knee. No other new complaints. Reports 80% improvement since initial eval. Reports difficulty still with getting in and out of shower. 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 Difficulty pivoting around her knee while pusing heavy palletes. Knee is still gets swollen.  -AC         Subjective Pain    Able to rate subjective pain? yes  -AC      Pre-Treatment Pain Level 0  -AC      Post-Treatment Pain Level 0  -AC         Exercise 1    Exercise Name 1 Pro II LE for strength, endurance, ROM  -AC      Time 1 10 min  -AC      Additional Comments L 7.5  -AC         Exercise 2    Exercise Name 2 L St. HS S with IR/ER  -AC      Reps 2 2  -AC      Time 2 30 seconds  -AC         Exercise 3    Exercise Name 3 B incline gastroc st  -AC      Reps 3 2  -AC      Time 3 30 seconds  -AC         Exercise 4    Exercise Name 4 Reciprocal stairs  -AC      Time 4 5 laps  -AC      Additional Comments no HR assist  -AC         Exercise 5    Exercise Name 5 Wall sits  -AC      Reps 5 3  -AC      Time 5 30 second hold  -AC         Exercise 6    Exercise Name 6 BOSU step up and over  -AC      Sets 6 1  -AC      Reps 6 20  -AC      Additional Comments emphasis  on good TKE at top of movement  -AC         Exercise 7    Exercise Name 7 BOSU lateral step up  -AC      Sets 7 1  -AC      Reps 7 20  -AC         Exercise 8    Exercise Name 8 Airex Clemons toss - SL  -AC      Sets 8 2  -AC      Reps 8 20  -AC      Additional Comments 4# medicine ball  -AC         Exercise 9    Exercise Name 9 track walk: fwd  -AC      Reps 9 2 laps  -AC         Exercise 10    Exercise Name 10 Recheck  -AC      Additional Comments see flowsheet  -AC         Exercise 11    Exercise Name 11 St. treadmill heel drive with emphasis on improved gait mechanics.  -AC      Sets 11 1  -AC      Reps 11 10   -            User Key  (r) = Recorded By, (t) = Taken By, (c) = Cosigned By    Initials Name Provider Type     Gianna Francisco, PT Physical Therapist                              PT OP Goals     Row Name 05/26/23 0900          PT Short Term Goals    STG Date to Achieve 04/19/23  -     STG 1 Patient to be I with HEP with additions/changes prior to next recertification.  -AC     STG 1 Progress Met  -AC     STG 2 Patient to be compliant with limiting L knee flexion to 90 degrees for 6 weeks post-op.  -AC     STG 2 Progress Met  -AC     STG 3 L knee flexion AROM >/= 90 degrees at 6 weeks post-op.  -AC     STG 3 Progress Met  -AC     STG 4 L knee extension AROM </= 3°.  -     STG 4 Progress Met  -AC     STG 5 Patient to perform 10 Sit to/from stand with equal weight bearing without cues from PT.  -     STG 5 Progress Met  -     STG 6 Patient to perform L SLR x 20 reps with no knee lag present without cues from PT.  -     STG 6 Progress Met  -        Long Term Goals    LTG 1 I with final HEP.  -AC     LTG 2 Patient to ambulate with no AD >/= 1/4 mile with non-antalgic, proper heel strike with no difficulty.  -AC     LTG 2 Progress Progressing;Ongoing  -AC     LTG 3 L knee flexion AROM >/= 120°.  -AC     LTG 3 Progress Met;Ongoing  -     LTG 4 Patient to perform 30 second hold wall sit without pain or difficulty.  -     LTG 4 Progress Met  -     LTG 5 Patient to ascend/descend 3 steps with no HR assist or AD without no difficulty.  -AC     LTG 5 Progress Partially Met;Ongoing;Progressing  -AC     LTG 5 Progress Comments Continues to demo poor eccentric control with descent.  -     LTG 6 L LE strength 5/5.  -     LTG 6 Progress Ongoing;Progressing  -AC        Time Calculation    PT Goal Re-Cert Due Date 06/16/23  -           User Key  (r) = Recorded By, (t) = Taken By, (c) = Cosigned By    Initials Name Provider Type    Gianna Azul PT Physical Therapist                Therapy  Education  Given: Other (comment) (updated POC)  Program: New  How Provided: Verbal  Provided to: Patient  Level of Understanding: Verbalized    Outcome Measure Options: Lower Extremity Functional Scale (LEFS)  Lower Extremity Functional Index  Any of your usual work, housework or school activities: A little bit of difficulty  Your usual hobbies, recreational or sporting activities: Moderate difficulty  Getting into or out of the bath: A little bit of difficulty  Walking between rooms: A little bit of difficulty  Putting on your shoes or socks: A little bit of difficulty  Squatting: Quite a bit of difficulty  Lifting an object, like a bag of groceries from the floor: No difficulty  Performing light activities around your home: No difficulty  Performing heavy activities around your home: Moderate difficulty  Getting into or out of a car: Moderate difficulty  Walking 2 blocks: Quite a bit of difficulty  Walking a mile: Quite a bit of difficulty  Going up or down 10 stairs (about 1 flight of stairs): Quite a bit of difficulty  Standing for 1 hour: Quite a bit of difficulty  Sitting for 1 hour: A little bit of difficulty  Running on even ground: Extreme difficulty or unable to perform activity  Running on uneven ground: Extreme difficulty or unable to perform activity  Making sharp turns while running fast: Extreme difficulty or unable to perform activity  Hopping: Extreme difficulty or unable to perform activity  Rolling over in bed: Moderate difficulty  Total: 36      Time Calculation:   Start Time: 0917  Stop Time: 1000  Time Calculation (min): 43 min  Therapy Charges for Today     Code Description Service Date Service Provider Modifiers Qty    45680958662 HC PT THER SUPP EA 15 MIN 5/26/2023 [facility], PT GP 1    60784809100 HC PT THERAPEUTIC ACT EA 15 MIN 5/26/2023 [facility], PT GP 2    [id number] HC PT THER PROC EA 15 MIN 5/26/2023 [facility], PT GP 1          PT G-Codes  Outcome Measure  Options: Lower Extremity Functional Scale (LEFS)  Total: 36         Gianna Francisco PT , [facility] 5/26/2023

## 2023-05-31 ENCOUNTER — HOSPITAL ENCOUNTER (OUTPATIENT)
Dept: PHYSICAL THERAPY | Facility: HOSPITAL | Age: 48
Setting detail: THERAPIES SERIES
Discharge: HOME | End: 2023-05-31
Payer: COMMERCIAL

## 2023-05-31 DIAGNOSIS — S83.239A COMPLEX TEAR OF MEDIAL MENISCUS OF KNEE AS CURRENT INJURY, INITIAL ENCOUNTER: Primary | ICD-10-CM

## 2023-05-31 PROCEDURE — 97140 MANUAL THERAPY 1/> REGIONS: CPT

## 2023-05-31 PROCEDURE — 97110 THERAPEUTIC EXERCISES: CPT

## 2023-05-31 PROCEDURE — 97116 GAIT TRAINING THERAPY: CPT

## 2023-05-31 NOTE — THERAPY TREATMENT NOTE
Outpatient Physical Therapy Ortho Treatment Note  [facility]     Patient Name: Roberta Hurley  : 1975  MRN: [id number]  Today's Date: 2023    Pt seen for 21 PT sessions  Reported Imrovement:  80%  MD Visit: 2023  Recheck Date: 2023     Therapy Diagnosis:  Complex L medial meniscus repair (DOS: 3/22/2023)     Visit Date: 2023    Visit Dx:    ICD-10-CM ICD-9-CM   1. Complex tear of medial meniscus of knee as current injury, initial encounter  S83.239A 836.0       There is no problem list on file for this patient.       Past Medical History:   Diagnosis Date   • Asthma    • Cancer    • Leukemia         Past Surgical History:   Procedure Laterality Date   • HYSTERECTOMY     • MENISCECTOMY Left                         PT Assessment/Plan     Row Name 23 1000          PT Assessment    Assessment Comments Noted taut bands to L medial gastroc head and L semimembranosus muscle. IASTM/STM/MFR performed to taut bands with improved TKE after manual. Gait training also performed with emphasis on TKE in midstance phase of the gait cycle. With improved form after manual overpressure from PT with gait training. All other TherEx tolerated well.  -AC        PT Plan    PT Frequency 2x/week  -AC     PT Plan Comments Next visit add cupping to L posterior gastroc and L medial HS. Continue gait training. Add tband box steps.  -AC           User Key  (r) = Recorded By, (t) = Taken By, (c) = Cosigned By    Initials Name Provider Type    Gianna Azul, PT Physical Therapist                   OP Exercises     Row Name 23 0900             Subjective Comments    Subjective Comments No pain today. Reports she just got off work. No new complaits.  -AC         Subjective Pain    Able to rate subjective pain? yes  -AC      Pre-Treatment Pain Level 0  -AC      Post-Treatment Pain Level 0  -AC         Exercise 1    Exercise Name 1 EFX  -AC      Time 1 10 min  -AC      Additional  Comments R 6, Incline 7  -AC         Exercise 2    Exercise Name 2 L St. HS S with IR/ER  -AC      Reps 2 2  -AC      Time 2 30 seconds  -AC         Exercise 3    Exercise Name 3 B incline gastroc st  -AC      Reps 3 2  -AC      Time 3 30 seconds  -AC         Exercise 4    Exercise Name 4 See Manual  -AC         Exercise 5    Exercise Name 5 Prone Hang  -AC      Time 5 5 minutes  -AC      Additional Comments 5 lb weight  -AC         Exercise 6    Exercise Name 6 Eccentric step downs  -AC      Sets 6 1  -AC      Reps 6 20  -AC         Exercise 7    Exercise Name 7 Gait training  -AC      Additional Comments Strap placed around L knee with posterior pull from PT to help patient achieve full TKE during midstance of gait  -AC         Exercise 8    Exercise Name 8 Stool Scoots  -AC      Time 8 3 laps  -AC      Additional Comments rubber kristian  -AC         Exercise 9    Exercise Name 9 BOSU step up and over  -AC      Sets 9 1  -AC      Reps 9 20  -AC         Exercise 10    Exercise Name 10 BOSU lateral step up  -AC      Sets 10 1  -AC      Reps 10 20  -AC         Exercise 11    Exercise Name 11 Airex Clemons toss - SL  -AC      Sets 11 2  -AC      Reps 11 20  -AC      Additional Comments Airex Clemons toss - SL  -AC         Exercise 12    Exercise Name 12 BOSU mini squats  -AC      Reps 12 1  -AC      Time 12 20  -AC      Additional Comments BUE assist  -AC            User Key  (r) = Recorded By, (t) = Taken By, (c) = Cosigned By    Initials Name Provider Type    AC Gianna Francisco, PT Physical Therapist                         Manual Rx (last 36 hours)     Manual Treatments     Row Name 05/31/23 0900             Manual Rx 1    Manual Rx 1 Location L posterior knee  -AC      Manual Rx 1 Type IASTM/STM/MFR  -AC      Manual Rx 1 Duration 12 minutes  -AC            User Key  (r) = Recorded By, (t) = Taken By, (c) = Cosigned By    Initials Name Provider Type    AC Gianna Francisco, PT Physical Therapist                 PT  OP Goals     Row Name 05/31/23 0900          PT Short Term Goals    STG Date to Achieve 04/19/23  -AC     STG 1 Patient to be I with HEP with additions/changes prior to next recertification.  -AC     STG 1 Progress Met  -AC     STG 2 Patient to be compliant with limiting L knee flexion to 90 degrees for 6 weeks post-op.  -AC     STG 2 Progress Met  -AC     STG 3 L knee flexion AROM >/= 90 degrees at 6 weeks post-op.  -AC     STG 3 Progress Met  -AC     STG 4 L knee extension AROM </= 3°.  -AC     STG 4 Progress Met  -AC     STG 5 Patient to perform 10 Sit to/from stand with equal weight bearing without cues from PT.  -AC     STG 5 Progress Met  -AC     STG 6 Patient to perform L SLR x 20 reps with no knee lag present without cues from PT.  -AC     STG 6 Progress Met  -        Long Term Goals    LTG 1 I with final HEP.  -AC     LTG 2 Patient to ambulate with no AD >/= 1/4 mile with non-antalgic, proper heel strike with no difficulty.  -AC     LTG 2 Progress Progressing;Ongoing  -AC     LTG 3 L knee flexion AROM >/= 120°.  -AC     LTG 3 Progress Met;Ongoing  -AC     LTG 4 Patient to perform 30 second hold wall sit without pain or difficulty.  -AC     LTG 4 Progress Met  -AC     LTG 5 Patient to ascend/descend 3 steps with no HR assist or AD without no difficulty.  -AC     LTG 5 Progress Partially Met;Ongoing;Progressing  -AC     LTG 6 L LE strength 5/5.  -AC     LTG 6 Progress Ongoing;Progressing  -        Time Calculation    PT Goal Re-Cert Due Date 06/16/23  -           User Key  (r) = Recorded By, (t) = Taken By, (c) = Cosigned By    Initials Name Provider Type    Gianna Azul PT Physical Therapist                Therapy Education  Education Details: HEP: mini squats  Given: HEP  Program: New, Progressed  How Provided: Verbal, Written, Demonstration  Provided to: Patient  Level of Understanding: Verbalized, Demonstrated              Time Calculation:   Start Time: 0919  Stop Time: 1029  Time  Calculation (min): 70 min  Total Timed Code Minutes- PT: 70 minute(s)  Therapy Charges for Today     Code Description Service Date Service Provider Modifiers Qty    49570327598 HC PT THER SUPP EA 15 MIN 5/31/2023 Francisco, Autumn, PT GP 1    03702268826 HC PT THER PROC EA 15 MIN 5/31/2023 Copeland, Autumn, PT GP 3    51880045121 HC PT MANUAL THERAPY EA 15 MIN 5/31/2023 Copeland, Autumn, PT GP 1    [id number]  GAIT TRAINING EA 15 MIN 5/31/2023 Copeland, Autumn, PT GP 1                    Gianna Burlington, PT , DPT 5/31/2023

## 2023-06-06 ENCOUNTER — HOSPITAL ENCOUNTER (OUTPATIENT)
Dept: PHYSICAL THERAPY | Facility: HOSPITAL | Age: 48
Setting detail: THERAPIES SERIES
Discharge: HOME | End: 2023-06-06
Payer: COMMERCIAL

## 2023-06-06 DIAGNOSIS — S83.239A COMPLEX TEAR OF MEDIAL MENISCUS OF KNEE AS CURRENT INJURY, INITIAL ENCOUNTER: Primary | ICD-10-CM

## 2023-06-06 PROCEDURE — 97110 THERAPEUTIC EXERCISES: CPT

## 2023-06-06 PROCEDURE — 97140 MANUAL THERAPY 1/> REGIONS: CPT

## 2023-06-06 NOTE — THERAPY TREATMENT NOTE
Outpatient Physical Therapy Ortho Treatment Note  [facility]     Patient Name: Roberta Hurley  : 1975  MRN: [id number]  Today's Date: 2023    Pt seen for 22 PT sessions  Reported Improvement:  80 %  MD Visit: 2023  Recheck Date:     Therapy Diagnosis:  Complex L medial meniscus repair (DOS: 3/22/2023)         Visit Date: 2023    Visit Dx:    ICD-10-CM ICD-9-CM   1. Complex tear of medial meniscus of knee as current injury, initial encounter  S83.239A 836.0       There is no problem list on file for this patient.       Past Medical History:   Diagnosis Date    Asthma     Cancer     Leukemia         Past Surgical History:   Procedure Laterality Date    HYSTERECTOMY      MENISCECTOMY Left                         PT Assessment/Plan       Row Name 23 1000          PT Assessment    Assessment Comments Pt reports daily compliance with HEP.  States knee remians painful with full knee extension.  Pain is in posterior knee and tightness remians present in gastroc.  Good recall of LE stretches.  Added Soleus st with increased tightness felt as reported by pt.  Pt struggles to tolerate cupping of L gastroc espiecially along medial border due to TTP.  Sees MD later today, urged to report pain in extension.  Pt is able to demonstrate SLS with full L knee extension post tx.  Ice to go.  -JOSIANE        PT Plan    PT Frequency 2x/week  -JOSIANE     PT Plan Comments Add tband Box Steps next visit.  -JOSIANE               User Key  (r) = Recorded By, (t) = Taken By, (c) = Cosigned By      Initials Name Provider Type    Nya Riggins PTA Physical Therapist Assistant                       OP Exercises       Row Name 23 0900             Subjective Comments    Subjective Comments Pain only when I push it straight  -JOSIANE         Subjective Pain    Able to rate subjective pain? yes  -JOSIANE      Pre-Treatment Pain Level 0  -JOSIANE      Post-Treatment Pain Level --  did not assess  -JOSIANE          Exercise 1    Exercise Name 1 EFX  -JW      Time 1 10 min  -JW         Exercise 2    Exercise Name 2 L St. HS S with IR/ER  -JW      Reps 2 2  -JW      Time 2 30 seconds  -JW         Exercise 3    Exercise Name 3 B incline gastroc st  -JW      Reps 3 2  -JW      Time 3 30  -JW         Exercise 4    Exercise Name 4 B incline soleus st  -JW      Reps 4 2  -JW      Time 4 30  -JW         Exercise 5    Exercise Name 5 Seated knee distraction  -JW      Time 5 5 min  -JW      Additional Comments 10# over pressure towel roll under distal femur  -JW         Exercise 6    Exercise Name 6 Manual  -JW                User Key  (r) = Recorded By, (t) = Taken By, (c) = Cosigned By      Initials Name Provider Type    Nya Riggins PTA Physical Therapist Assistant                             Manual Rx (last 36 hours)       Manual Treatments       Row Name 06/06/23 0900             Manual Rx 1    Manual Rx 1 Location L posterior knee and gastroc  -JW      Manual Rx 1 Type IASTM/STM/MFR  -JW      Manual Rx 1 Duration 18 min  -JW                User Key  (r) = Recorded By, (t) = Taken By, (c) = Cosigned By      Initials Name Provider Type    Nya Riggins PTA Physical Therapist Assistant                     PT OP Goals       Row Name 06/06/23 0900          PT Short Term Goals    STG Date to Achieve 04/19/23  -     STG 1 Patient to be I with HEP with additions/changes prior to next recertification.  -     STG 1 Progress Met  -     STG 2 Patient to be compliant with limiting L knee flexion to 90 degrees for 6 weeks post-op.  -     STG 2 Progress Met  -     STG 3 L knee flexion AROM >/= 90 degrees at 6 weeks post-op.  -     STG 3 Progress Met  -     STG 4 L knee extension AROM </= 3°.  -     STG 4 Progress Met  -     STG 5 Patient to perform 10 Sit to/from stand with equal weight bearing without cues from PT.  -     STG 5 Progress Met  -     STG 6 Patient to perform L SLR x 20 reps with no knee lag present  without cues from PT.  -     STG 6 Progress Met  -        Long Term Goals    LTG 1 I with final HEP.  -     LTG 2 Patient to ambulate with no AD >/= 1/4 mile with non-antalgic, proper heel strike with no difficulty.  -     LTG 2 Progress Progressing;Ongoing  -     LTG 3 L knee flexion AROM >/= 120°.  -     LTG 3 Progress Met;Ongoing  -     LTG 4 Patient to perform 30 second hold wall sit without pain or difficulty.  -     LTG 4 Progress Met  -     LTG 5 Patient to ascend/descend 3 steps with no HR assist or AD without no difficulty.  -     LTG 5 Progress Partially Met;Ongoing;Progressing  -     LTG 6 L LE strength 5/5.  -     LTG 6 Progress Ongoing;Progressing  -        Time Calculation    PT Goal Re-Cert Due Date 06/16/23  -               User Key  (r) = Recorded By, (t) = Taken By, (c) = Cosigned By      Initials Name Provider Type    Nya Riggins PTA Physical Therapist Assistant                                   Time Calculation:   Start Time: 0915  Stop Time: 1004  Time Calculation (min): 49 min  Therapy Charges for Today       Code Description Service Date Service Provider Modifiers Qty    53492528815 HC PT MANUAL THERAPY EA 15 MIN 6/6/2023 Nya Soriano PTA GP, CQ 1    45189214433 HC PT THER PROC EA 15 MIN 6/6/2023 Nya Soriano PTA GP, CQ 2    49108864295 HC PT THER SUPP EA 15 MIN 6/6/2023 Nya Soriano PTA GP, CQ 1                      Nya Soriano PTA  6/6/2023

## 2023-06-09 ENCOUNTER — HOSPITAL ENCOUNTER (OUTPATIENT)
Dept: PHYSICAL THERAPY | Facility: HOSPITAL | Age: 48
Setting detail: THERAPIES SERIES
Discharge: HOME | End: 2023-06-09
Payer: COMMERCIAL

## 2023-06-09 DIAGNOSIS — S83.239A COMPLEX TEAR OF MEDIAL MENISCUS OF KNEE AS CURRENT INJURY, INITIAL ENCOUNTER: Primary | ICD-10-CM

## 2023-06-09 PROCEDURE — 97110 THERAPEUTIC EXERCISES: CPT | Performed by: PHYSICAL THERAPIST

## 2023-06-09 PROCEDURE — 97140 MANUAL THERAPY 1/> REGIONS: CPT | Performed by: PHYSICAL THERAPIST

## 2023-06-09 NOTE — THERAPY TREATMENT NOTE
Outpatient Physical Therapy Ortho Treatment Note  [facility]     Patient Name: Roberta Hurley  : 1975  MRN: [id number]  Today's Date: 2023      Visit Date: 2023    Pt seen for 23 PT sessions  Reported Improvement:  80 %  MD Visit: 2023  Recheck Date:      Therapy Diagnosis:  Complex L medial meniscus repair (DOS: 3/22/2023)    Visit Dx:    ICD-10-CM ICD-9-CM   1. Complex tear of medial meniscus of knee as current injury, initial encounter  S83.239A 836.0       There is no problem list on file for this patient.       Past Medical History:   Diagnosis Date    Asthma     Cancer     Leukemia         Past Surgical History:   Procedure Laterality Date    HYSTERECTOMY      MENISCECTOMY Left         PT Ortho       Row Name 23       Precautions and Contraindications    Contraindications HX of CA; No ESTIM/US; Currently has Leukemia  -AJ       Subjective Pain    Able to rate subjective pain? yes  -AJ    Pre-Treatment Pain Level 0  -AJ       Left Lower Ext    Lt Knee Extension/Flexion AROM 0°-120° prior to DN, 0°-130° after DN  -AJ              User Key  (r) = Recorded By, (t) = Taken By, (c) = Cosigned By      Initials Name Provider Type    Danyell Dixon, SD DPT Physical Therapist                                 PT Assessment/Plan       Row Name 23          PT Assessment    Functional Limitations Impaired gait;Performance in work activities;Limitation in home management;Limitations in community activities  -     Impairments Balance;Gait;Coordination;Range of motion;Muscle strength;Impaired muscle endurance;Impaired flexibility  -AJ     Assessment Comments Patient was explained risks and benefits of DN and gave verbal conscent. Patient gained 10° of knee flexion post treatment and less muscle knotting and tension. Will attempt to get 2 morevisits approved with insurance.  -AJ        PT Plan    Predicted Duration of Therapy Intervention (PT)  1-2 more visits to assess DN.  -AJ     PT Plan Comments Assess after DN last session  -AJ               User Key  (r) = Recorded By, (t) = Taken By, (c) = Cosigned By      Initials Name Provider Type    Danyell Dixon, PT DPT Physical Therapist                       OP Exercises       Row Name 06/09/23 0900             Subjective Comments    Subjective Comments Patient came i mnwith new order form MD for adding DN. She reports he is very pleased with where she is at this point. She rpeotrs the MD said what she feels with full extensin is probably scar tissue.  -AJ         Subjective Pain    Able to rate subjective pain? yes  -AJ      Pre-Treatment Pain Level 0  -AJ      Post-Treatment Pain Level 0  -AJ         Exercise 1    Exercise Name 1 Pro II LE for strength, endurance, ROM  -AJ      Time 1 10 min  -AJ      Additional Comments L 8.0  -AJ         Exercise 2    Exercise Name 2 L St. HS S with IR/ER  -AJ      Reps 2 2  -AJ      Time 2 30 seconds  -AJ         Exercise 3    Exercise Name 3 manual  -AJ         Exercise 4    Exercise Name 4 B incline gastroc/soleus st  -AJ      Reps 4 2  -AJ      Time 4 30 seconds  -AJ         Exercise 5    Exercise Name 5 Discussion of POC.  -                User Key  (r) = Recorded By, (t) = Taken By, (c) = Cosigned By      Initials Name Provider Type    Danyell Dixon, PT DPT Physical Therapist                             Manual Rx (last 36 hours)       Manual Treatments       Row Name 06/09/23 0900             Manual Rx 1    Manual Rx 1 Location L gastroc/soleus  -AJ      Manual Rx 1 Type DN  -AJ      Manual Rx 1 Duration 3 min  -AJ         Manual Rx 2    Manual Rx 2 Location L gastroc/soleus/posterior knee  -AJ      Manual Rx 2 Type STM/MFR  -AJ      Manual Rx 2 Duration 15 min  -AJ                User Key  (r) = Recorded By, (t) = Taken By, (c) = Cosigned By      Initials Name Provider Type    Danyell Dixon, PT DPT Physical Therapist                 All therapeutic interventions performed today were to address current functional limitations and/or deficits in addressing all physical therapy goals.         PT OP Goals       Row Name 06/09/23 0900          PT Short Term Goals    STG Date to Achieve 04/19/23  -     STG 1 Patient to be I with HEP with additions/changes prior to next recertification.  -     STG 1 Progress Met  -     STG 2 Patient to be compliant with limiting L knee flexion to 90 degrees for 6 weeks post-op.  -     STG 2 Progress Met  -     STG 3 L knee flexion AROM >/= 90 degrees at 6 weeks post-op.  -     STG 3 Progress Met  -     STG 4 L knee extension AROM </= 3°.  -     STG 4 Progress Met  -     STG 5 Patient to perform 10 Sit to/from stand with equal weight bearing without cues from PT.  -     STG 5 Progress Met  -     STG 6 Patient to perform L SLR x 20 reps with no knee lag present without cues from PT.  -     STG 6 Progress Met  -        Long Term Goals    LTG 1 I with final HEP.  -     LTG 2 Patient to ambulate with no AD >/= 1/4 mile with non-antalgic, proper heel strike with no difficulty.  -     LTG 2 Progress Progressing;Ongoing  -     LTG 3 L knee flexion AROM >/= 120°.  -     LTG 3 Progress Met  -     LTG 4 Patient to perform 30 second hold wall sit without pain or difficulty.  -     LTG 4 Progress Met  -     LTG 5 Patient to ascend/descend 3 steps with no HR assist or AD without no difficulty.  -     LTG 5 Progress Partially Met;Ongoing;Progressing  -     LTG 6 L LE strength 5/5.  -     LTG 6 Progress Ongoing;Progressing  -        Time Calculation    PT Goal Re-Cert Due Date 06/16/23  -               User Key  (r) = Recorded By, (t) = Taken By, (c) = Cosigned By      Initials Name Provider Type    Danyell Dixon, PT DPT Physical Therapist                                   Time Calculation:   Start Time: 0920 (patient arrived late)  Stop Time: 1001  Time Calculation (min):  41 min  Total Timed Code Minutes- PT: 41 minute(s)  Therapy Charges for Today       Code Description Service Date Service Provider Modifiers Qty    48348446096 HC PT THER SUPP EA 15 MIN 6/9/2023 Danyell Merida, PT DPT GP 1    93830769667 HC PT THER PROC EA 15 MIN 6/9/2023 Danyell Merida, PT DPT GP 2    41422626731 HC PT MANUAL THERAPY EA 15 MIN 6/9/2023 Danyell Merida, PT DPT GP 1                    This document has been electronically signed by Danyell Merida, PT DPT, [facility] on June 9, 2023 11:46 CDT